# Patient Record
Sex: FEMALE | Race: BLACK OR AFRICAN AMERICAN | NOT HISPANIC OR LATINO | Employment: FULL TIME | URBAN - METROPOLITAN AREA
[De-identification: names, ages, dates, MRNs, and addresses within clinical notes are randomized per-mention and may not be internally consistent; named-entity substitution may affect disease eponyms.]

---

## 2017-08-29 ENCOUNTER — TRANSFERRED RECORDS (OUTPATIENT)
Dept: MULTI SPECIALTY CLINIC | Facility: CLINIC | Age: 37
End: 2017-08-29

## 2017-08-29 LAB
HPV ABSTRACT: NORMAL
PAP-ABSTRACT: NORMAL

## 2017-10-01 ENCOUNTER — HEALTH MAINTENANCE LETTER (OUTPATIENT)
Age: 37
End: 2017-10-01

## 2021-05-03 ENCOUNTER — OFFICE VISIT (OUTPATIENT)
Dept: FAMILY MEDICINE | Facility: CLINIC | Age: 41
End: 2021-05-03
Payer: COMMERCIAL

## 2021-05-03 VITALS
DIASTOLIC BLOOD PRESSURE: 102 MMHG | HEIGHT: 62 IN | WEIGHT: 170 LBS | HEART RATE: 82 BPM | BODY MASS INDEX: 31.28 KG/M2 | TEMPERATURE: 98.4 F | RESPIRATION RATE: 16 BRPM | OXYGEN SATURATION: 98 % | SYSTOLIC BLOOD PRESSURE: 170 MMHG

## 2021-05-03 DIAGNOSIS — L02.412 ABSCESS OF AXILLA, LEFT: ICD-10-CM

## 2021-05-03 DIAGNOSIS — D68.51 FACTOR V LEIDEN CARRIER (H): ICD-10-CM

## 2021-05-03 DIAGNOSIS — I10 BENIGN ESSENTIAL HYPERTENSION: ICD-10-CM

## 2021-05-03 DIAGNOSIS — Z00.00 ROUTINE GENERAL MEDICAL EXAMINATION AT A HEALTH CARE FACILITY: Primary | ICD-10-CM

## 2021-05-03 DIAGNOSIS — F41.9 ANXIETY: ICD-10-CM

## 2021-05-03 PROCEDURE — 99214 OFFICE O/P EST MOD 30 MIN: CPT | Mod: 25 | Performed by: FAMILY MEDICINE

## 2021-05-03 PROCEDURE — 96127 BRIEF EMOTIONAL/BEHAV ASSMT: CPT | Performed by: FAMILY MEDICINE

## 2021-05-03 PROCEDURE — 99386 PREV VISIT NEW AGE 40-64: CPT | Performed by: FAMILY MEDICINE

## 2021-05-03 RX ORDER — ESCITALOPRAM OXALATE 10 MG/1
TABLET ORAL
COMMUNITY
Start: 2020-05-22 | End: 2021-05-03

## 2021-05-03 RX ORDER — LOSARTAN POTASSIUM 100 MG/1
100 TABLET ORAL
Status: CANCELLED | OUTPATIENT
Start: 2021-05-03

## 2021-05-03 RX ORDER — LABETALOL 200 MG/1
TABLET, FILM COATED ORAL
COMMUNITY
End: 2021-05-03

## 2021-05-03 RX ORDER — AMLODIPINE AND VALSARTAN 5; 320 MG/1; MG/1
1 TABLET ORAL DAILY
Qty: 90 TABLET | Refills: 0 | Status: SHIPPED | OUTPATIENT
Start: 2021-05-03 | End: 2021-07-28

## 2021-05-03 RX ORDER — HYDROCHLOROTHIAZIDE 50 MG/1
50 TABLET ORAL DAILY
COMMUNITY
End: 2021-05-03

## 2021-05-03 RX ORDER — METOPROLOL SUCCINATE 100 MG/1
100 TABLET, EXTENDED RELEASE ORAL DAILY
Qty: 90 TABLET | Refills: 0 | Status: SHIPPED | OUTPATIENT
Start: 2021-05-03 | End: 2021-09-14

## 2021-05-03 RX ORDER — HYDROCHLOROTHIAZIDE 50 MG/1
50 TABLET ORAL DAILY
Qty: 90 TABLET | Refills: 0 | Status: SHIPPED | OUTPATIENT
Start: 2021-05-03 | End: 2021-07-28

## 2021-05-03 RX ORDER — METOPROLOL SUCCINATE 100 MG/1
100 TABLET, EXTENDED RELEASE ORAL DAILY
COMMUNITY
Start: 2020-08-20 | End: 2021-05-03

## 2021-05-03 SDOH — HEALTH STABILITY: MENTAL HEALTH: HOW OFTEN DO YOU HAVE 6 OR MORE DRINKS ON ONE OCCASION?: NEVER

## 2021-05-03 SDOH — HEALTH STABILITY: MENTAL HEALTH: HOW MANY STANDARD DRINKS CONTAINING ALCOHOL DO YOU HAVE ON A TYPICAL DAY?: 1 OR 2

## 2021-05-03 SDOH — HEALTH STABILITY: MENTAL HEALTH: HOW OFTEN DO YOU HAVE A DRINK CONTAINING ALCOHOL?: 2-4 TIMES A MONTH

## 2021-05-03 ASSESSMENT — ANXIETY QUESTIONNAIRES
5. BEING SO RESTLESS THAT IT IS HARD TO SIT STILL: NEARLY EVERY DAY
2. NOT BEING ABLE TO STOP OR CONTROL WORRYING: NEARLY EVERY DAY
3. WORRYING TOO MUCH ABOUT DIFFERENT THINGS: NEARLY EVERY DAY
1. FEELING NERVOUS, ANXIOUS, OR ON EDGE: NEARLY EVERY DAY
7. FEELING AFRAID AS IF SOMETHING AWFUL MIGHT HAPPEN: NEARLY EVERY DAY
IF YOU CHECKED OFF ANY PROBLEMS ON THIS QUESTIONNAIRE, HOW DIFFICULT HAVE THESE PROBLEMS MADE IT FOR YOU TO DO YOUR WORK, TAKE CARE OF THINGS AT HOME, OR GET ALONG WITH OTHER PEOPLE: VERY DIFFICULT
GAD7 TOTAL SCORE: 21
6. BECOMING EASILY ANNOYED OR IRRITABLE: NEARLY EVERY DAY

## 2021-05-03 ASSESSMENT — MIFFLIN-ST. JEOR: SCORE: 1394.36

## 2021-05-03 ASSESSMENT — PATIENT HEALTH QUESTIONNAIRE - PHQ9
5. POOR APPETITE OR OVEREATING: NEARLY EVERY DAY
SUM OF ALL RESPONSES TO PHQ QUESTIONS 1-9: 19

## 2021-05-03 ASSESSMENT — PAIN SCALES - GENERAL: PAINLEVEL: NO PAIN (0)

## 2021-05-03 NOTE — Clinical Note
[place] [facility] [place]. Please abstract both into patient chart. Ally May M.D.

## 2021-05-03 NOTE — PATIENT INSTRUCTIONS
At [facility], we strive to deliver an exceptional experience to you, every time we see you. If you receive a survey, please complete it as we do value your feedback.  If you have MyChart, you can expect to receive results automatically within 24 hours of their completion.  Your provider will send a note interpreting your results as well.   If you do not have MyChart, you should receive your results in about a week by mail.    Your care team:                            Family Medicine Internal Medicine   MD Mu Buchanan MD Shantel Branch-Fleming, MD Srinivasa Vaka, MD Katya Belousova, PAVaniaC  Nory Ma, APRN CNP    Jordan Melton, MD Pediatrics   Haja Barraza, PACHRISTOPHER Richardson, CNP MD Daniella Roger APRN CNP   MD Makenzie Martin MD Deborah Mielke, MD Yara Ruano, APRN [facility]      Clinic hours: Monday - Thursday 7 am-6 pm; Fridays 7 am-5 pm.   Urgent care: Monday - Friday 10 am- 8 pm; Saturday and Sunday 9 am-5 pm.    Clinic: [phone]       [facility]: Monday - Thursday 8 am - 7 pm; Friday 8 am - 6 pm  [facility]: [phone]     Use [email or web address] for 24/7 diagnosis and treatment of dozens of conditions.    Preventive Health Recommendations  Female Ages 40 to 49    Yearly exam:     See your health care provider every year in order to  1. Review health changes.   2. Discuss preventive care.    3. Review your medicines if your doctor prescribed any.      Get a Pap test every three years (unless you have an abnormal result and your provider advises testing more often).      If you get Pap tests with HPV test, you only need to test every 5 years, unless you have an abnormal result. You do not need a Pap test if your uterus was removed (hysterectomy) and you have not had cancer.      You should be tested each year for STDs (sexually transmitted diseases), if you're  at risk.     Ask your doctor if you should have a mammogram.      Have a colonoscopy (test for colon cancer) if someone in your family has had colon cancer or polyps before age 50.       Have a cholesterol test every 5 years.       Have a diabetes test (fasting glucose) after age 45. If you are at risk for diabetes, you should have this test every 3 years.    Shots: Get a flu shot each year. Get a tetanus shot every 10 years.     Nutrition:     Eat at least 5 servings of fruits and vegetables each day.    Eat whole-grain bread, whole-wheat pasta and brown rice instead of white grains and rice.    Get adequate Calcium and Vitamin D.      Lifestyle    Exercise at least 150 minutes a week (an average of 30 minutes a day, 5 days a week). This will help you control your weight and prevent disease.    Limit alcohol to one drink per day.    No smoking.     Wear sunscreen to prevent skin cancer.    See your dentist every six months for an exam and cleaning.    At [facility], we strive to deliver an exceptional experience to you, every time we see you. If you receive a survey, please complete it as we do value your feedback.  If you have gDecidehart, you can expect to receive results automatically within 24 hours of their completion.  Your provider will send a note interpreting your results as well.   If you do not have MyChart, you should receive your results in about a week by mail.    Your care team:                            Family Medicine Internal Medicine   MD Mu Buchanan MD Shantel Branch-Fleming, MD Srinivasa Vaka, MD Katya Belousova, PA-C Megan Hill, APRN CNP Nam Ho, MD Pediatrics   MALIKA Kennedy CNP Paula Brito, MD Amelia Massimini APRN MD Makenzie Fulton MD Deborah Mielke, MD Kim Thein, RICO [facility]      Clinic hours: Monday - Thursday 7 am-6 pm; Fridays 7 am-5 pm.   Urgent care:  Monday - Friday 10 am- 8 pm; Saturday and Sunday 9 am-5 pm.    Clinic: [phone]       [facility]: Monday - Thursday 8 am - 7 pm; Friday 8 am - 6 pm  [facility]: [phone]     Use [email or web address] for 24/7 diagnosis and treatment of dozens of conditions.

## 2021-05-03 NOTE — PROGRESS NOTES
SUBJECTIVE:   CC: Kandy Rosales is an 41 year old woman who presents for preventive health visit.     Patient has been advised of split billing requirements and indicates understanding: Yes  Healthy Habits:    Do you get at least three servings of calcium containing foods daily (dairy, green leafy vegetables, etc.)? No     Amount of exercise or daily activities, outside of work: 3 day(s) per week    Problems taking medications regularly No    Medication side effects: No    Have you had an eye exam in the past two years? yes    Do you see a dentist twice per year? no    Do you have sleep apnea, excessive snoring or daytime drowsiness?no    Was positive for COVID April 17 and stopped medications. Restarted medication about 5 days ago.  HTN wasn't completely controlled before.    PAP done 2017 and good for 5 years    Depression/anxiety - work from home which she likes. Feels lexapro didn't do much.  Felt more anxiety going out and interacting since has been sick.  Is able go out and do things.  Was on lexapro as first medication only but stopped months ago.    Today's PHQ-2 Score:   PHQ-2 ( 1999 Pfizer) 5/3/2021   Q1: Little interest or pleasure in doing things 3   Q2: Feeling down, depressed or hopeless 1   PHQ-2 Score 4       Abuse: Current or Past(Physical, Sexual or Emotional)- No  Do you feel safe in your environment? Yes        Social History     Tobacco Use     Smoking status: Never Smoker     Smokeless tobacco: Never Used   Substance Use Topics     Alcohol use: Yes     Frequency: 2-4 times a month     Drinks per session: 1 or 2     Binge frequency: Never     If you drink alcohol do you typically have >3 drinks per day or >7 drinks per week? No                     Reviewed orders with patient.  Reviewed health maintenance and updated orders accordingly - Yes  Labs reviewed in EPIC    FSH-7:   Breast CA Risk Assessment (FHS-7) 5/3/2021 5/3/2021   Did any of your first-degree relatives have breast or  "ovarian cancer? No Yes   Did any of your relatives have bilateral breast cancer? No Unknown   Did any man in your family have breast cancer? No No   Did any woman in your family have breast and ovarian cancer? Yes Yes   Did any woman in your family have breast cancer before age 50 y? No Yes   Do you have 2 or more relatives with breast and/or ovarian cancer? Yes Yes   Do you have 2 or more relatives with breast and/or bowel cancer? No Yes       Mammogram Screening - Offered annual screening and updated Health Maintenance for [place] plan based on risk factor consideration    Pertinent mammograms are reviewed under the imaging tab.    Pertinent mammograms are reviewed under the imaging tab.  History of abnormal Pap smear: NO - age 30-65 PAP every 5 years with negative HPV co-testing recommended  PAP / HPV 6/30/2009   PAP NIL     Reviewed and updated as needed this visit by clinical staff  Tobacco   Meds  Problems  Med Hx  Surg Hx  Fam Hx  Soc Hx        Reviewed and updated as needed this visit by Provider  Tobacco   Meds  Problems  Med Hx  Surg Hx  Fam Hx             ROS:  10 point ROS of systems including Constitutional, Eyes, Respiratory, Cardiovascular, Gastroenterology, Genitourinary, Integumentary, Muscularskeletal, Psychiatric were all negative except for pertinent positives noted in my HPI.     OBJECTIVE:   BP (!) 170/102 (BP Location: Right arm, Patient Position: Chair, Cuff Size: Adult Large)   Pulse 82   Temp 98.4  F (36.9  C) (Tympanic)   Resp 16   Ht 1.575 m (5' 2\")   Wt 77.1 kg (170 lb)   LMP 04/27/2021 (Exact Date)   SpO2 98%   BMI 31.09 kg/m    EXAM:  GENERAL: healthy, alert and no distress  EYES: Eyes grossly normal to inspection, PERRL and conjunctivae and sclerae normal  HENT: ear canals and TM's normal, nose and mouth without ulcers or lesions  NECK: no adenopathy, no asymmetry, masses, or scars and thyroid normal to palpation  RESP: lungs clear to auscultation - no rales, " rhonchi or wheezes  BREAST: normal without masses, tenderness or nipple discharge and no palpable axillary masses or adenopathy  CV: regular rate and rhythm, normal S1 S2, no S3 or S4, no murmur, click or rub, no peripheral edema and peripheral pulses strong  ABDOMEN: soft, nontender, no hepatosplenomegaly, no masses and bowel sounds normal  MS: no gross musculoskeletal defects noted, no edema  SKIN: no suspicious lesions or rashes  NEURO: Normal strength and tone, mentation intact and speech normal  PSYCH: mentation appears normal, affect normal/bright    Diagnostic Test Results:  Labs reviewed in Epic    ASSESSMENT/PLAN:   1. Routine general medical examination at a health care facility  Routine preventive discussed    2. Benign essential hypertension  Not controlled - add amlodipine and change losartan to valsartan  - hydrochlorothiazide (HYDRODIURIL) 50 MG tablet; Take 1 tablet (50 mg) by mouth daily  Dispense: 90 tablet; Refill: 0  - metoprolol succinate ER (TOPROL-XL) 100 MG 24 hr tablet; Take 1 tablet (100 mg) by mouth daily  Dispense: 90 tablet; Refill: 0  - amLODIPine-valsartan (EXFORGE) 5-320 MG tablet; Take 1 tablet by mouth daily  Dispense: 90 tablet; Refill: 0    3. Anxiety/Depression  Not controlled - discussed medication but patient refused for now.    4. Factor V Leiden carrier (H)  Monitor    5. Abscess of axilla, left  Referral for surgical evaluation.  - GENERAL SURG ADULT REFERRAL; Future    The uses and side effects, including black box warnings as appropriate, were discussed in detail.  All patient questions were answered.  The patient was instructed to call immediately if any side effects developed.     Patient has been advised of split billing requirements and indicates understanding: Yes  COUNSELING:   Reviewed preventive health counseling, as reflected in patient instructions    Estimated body mass index is 31.09 kg/m  as calculated from the following:    Height as of this encounter: 1.575  "m (5' 2\").    Weight as of this encounter: 77.1 kg (170 lb).    Weight management plan: Discussed healthy diet and exercise guidelines    She reports that she has never smoked. She has never used smokeless tobacco.      Counseling Resources:  ATP IV Guidelines  Pooled Cohorts Equation Calculator  Breast Cancer Risk Calculator  BRCA-Related Cancer Risk Assessment: FHS-7 Tool  FRAX Risk Assessment  ICSI Preventive Guidelines  Dietary Guidelines for Americans, 2010  USDA's MyPlate  ASA Prophylaxis  Lung CA Screening    Ally Covington MD  [facility]  "

## 2021-05-04 ASSESSMENT — ANXIETY QUESTIONNAIRES: GAD7 TOTAL SCORE: 21

## 2021-07-28 DIAGNOSIS — I10 BENIGN ESSENTIAL HYPERTENSION: ICD-10-CM

## 2021-07-28 RX ORDER — HYDROCHLOROTHIAZIDE 50 MG/1
TABLET ORAL
Qty: 30 TABLET | Refills: 0 | Status: SHIPPED | OUTPATIENT
Start: 2021-07-28 | End: 2021-08-24

## 2021-07-28 RX ORDER — AMLODIPINE AND VALSARTAN 5; 320 MG/1; MG/1
TABLET ORAL
Qty: 30 TABLET | Refills: 0 | Status: SHIPPED | OUTPATIENT
Start: 2021-07-28 | End: 2021-08-24

## 2021-07-28 NOTE — TELEPHONE ENCOUNTER
"Requested Prescriptions   Pending Prescriptions Disp Refills    hydrochlorothiazide (HYDRODIURIL) 50 MG tablet [Pharmacy Med Name: HYDROCHLOROTHIAZIDE 50 MG TAB] 90 tablet 0     Sig: TAKE 1 TABLET BY MOUTH EVERY DAY        Diuretics (Including Combos) Protocol Failed - 7/28/2021  1:18 AM        Failed - Blood pressure under 140/90 in past 12 months       BP Readings from Last 3 Encounters:   05/03/21 (!) 170/102                 Failed - Normal serum creatinine on file in past 12 months     No lab results found.           Failed - Normal serum potassium on file in past 12 months     No lab results found.                 Failed - Normal serum sodium on file in past 12 months     No lab results found.           Passed - Recent (12 mo) or future (30 days) visit within the authorizing provider's specialty     Patient has had an office visit with the authorizing provider or a provider within the authorizing providers department within the previous 12 mos or has a future within next 30 days. See \"Patient Info\" tab in inbasket, or \"Choose Columns\" in Meds & Orders section of the refill encounter.              Passed - Medication is active on med list        Passed - Patient is age 18 or older        Passed - No active pregancy on record        Passed - No positive pregnancy test in past 12 months           amLODIPine-valsartan (EXFORGE) 5-320 MG tablet [Pharmacy Med Name: AMLODIPINE-VALSARTAN 5-320 MG] 90 tablet 0     Sig: TAKE 1 TABLET BY MOUTH EVERY DAY        Angiotensin-II Receptors Failed - 7/28/2021  1:18 AM        Failed - Last blood pressure under 140/90 in past 12 months       BP Readings from Last 3 Encounters:   05/03/21 (!) 170/102                 Failed - Normal serum creatinine on file in past 12 months     No lab results found.    Ok to refill medication if creatinine is low          Failed - Normal serum potassium on file in past 12 months     No lab results found.                 Passed - Recent (12 mo) or " "future (30 days) visit within the authorizing provider's specialty     Patient has had an office visit with the authorizing provider or a provider within the authorizing providers department within the previous 12 mos or has a future within next 30 days. See \"Patient Info\" tab in inbasket, or \"Choose Columns\" in Meds & Orders section of the refill encounter.              Passed - Medication is active on med list        Passed - Patient is age 18 or older        Passed - No active pregnancy on record        Passed - No positive pregnancy test in past 12 months       Calcium Channel Blockers Protocol  Failed - 7/28/2021  1:18 AM        Failed - Blood pressure under 140/90 in past 12 months       BP Readings from Last 3 Encounters:   05/03/21 (!) 170/102                 Failed - Normal serum creatinine on file in past 12 months     No lab results found.    Ok to refill medication if creatinine is low          Passed - Recent (12 mo) or future (30 days) visit within the authorizing provider's specialty     Patient has had an office visit with the authorizing provider or a provider within the authorizing providers department within the previous 12 mos or has a future within next 30 days. See \"Patient Info\" tab in inbasket, or \"Choose Columns\" in Meds & Orders section of the refill encounter.              Passed - Medication is active on med list        Passed - Patient is age 18 or older        Passed - No active pregnancy on record        Passed - No positive pregnancy test in past 12 months              "

## 2021-08-23 DIAGNOSIS — I10 BENIGN ESSENTIAL HYPERTENSION: ICD-10-CM

## 2021-08-24 RX ORDER — AMLODIPINE AND VALSARTAN 5; 320 MG/1; MG/1
TABLET ORAL
Qty: 30 TABLET | Refills: 0 | Status: SHIPPED | OUTPATIENT
Start: 2021-08-24 | End: 2021-09-14 | Stop reason: DRUGHIGH

## 2021-08-24 RX ORDER — HYDROCHLOROTHIAZIDE 50 MG/1
TABLET ORAL
Qty: 30 TABLET | Refills: 0 | Status: SHIPPED | OUTPATIENT
Start: 2021-08-24 | End: 2021-09-14

## 2021-08-24 NOTE — TELEPHONE ENCOUNTER
Routing refill request to provider for review/approval because:  Labs not current:    Potassium   Date Value Ref Range Status   06/30/2009 3.7 3.4 - 5.3 mmol/L Final     Creatinine   Date Value Ref Range Status   06/30/2009 0.67 0.52 - 1.04 mg/dL Final     Comment:     New IDMS-traceable calibration  beginning 5/1/08     Creatinine   Date Value Ref Range Status   06/30/2009 0.67 0.52 - 1.04 mg/dL Final     Comment:     New IDMS-traceable calibration  beginning 5/1/08     BP Readings from Last 6 Encounters:   05/03/21 (!) 170/102     Tita refill given and patient has not yet scheduled an appointment.  Health Maintenance Due   Topic Date Due     ANNUAL REVIEW OF HM ORDERS  Never done     ADVANCE CARE PLANNING  Never done     DEPRESSION ACTION PLAN  Never done     HIV SCREENING  Never done     HEPATITIS C SCREENING  Never done     COVID-19 Vaccine (2 - Pfizer 2-dose series) 07/27/2021     Tawanna Chi RN

## 2021-09-14 ENCOUNTER — OFFICE VISIT (OUTPATIENT)
Dept: FAMILY MEDICINE | Facility: CLINIC | Age: 41
End: 2021-09-14
Payer: COMMERCIAL

## 2021-09-14 VITALS
WEIGHT: 174 LBS | BODY MASS INDEX: 31.83 KG/M2 | OXYGEN SATURATION: 100 % | TEMPERATURE: 97.7 F | DIASTOLIC BLOOD PRESSURE: 95 MMHG | SYSTOLIC BLOOD PRESSURE: 180 MMHG | HEART RATE: 58 BPM

## 2021-09-14 DIAGNOSIS — Z23 NEED FOR PROPHYLACTIC VACCINATION AND INOCULATION AGAINST INFLUENZA: ICD-10-CM

## 2021-09-14 DIAGNOSIS — I10 BENIGN ESSENTIAL HYPERTENSION: Primary | ICD-10-CM

## 2021-09-14 DIAGNOSIS — Z02.89 ENCOUNTER FOR COMPLETION OF FORM WITH PATIENT: ICD-10-CM

## 2021-09-14 DIAGNOSIS — F41.9 ANXIETY: ICD-10-CM

## 2021-09-14 DIAGNOSIS — F32.1 CURRENT MODERATE EPISODE OF MAJOR DEPRESSIVE DISORDER WITHOUT PRIOR EPISODE (H): ICD-10-CM

## 2021-09-14 DIAGNOSIS — D68.51 FACTOR V LEIDEN CARRIER (H): ICD-10-CM

## 2021-09-14 LAB
ANION GAP SERPL CALCULATED.3IONS-SCNC: 7 MMOL/L (ref 3–14)
BUN SERPL-MCNC: 17 MG/DL (ref 7–30)
CALCIUM SERPL-MCNC: 9.4 MG/DL (ref 8.5–10.1)
CHLORIDE BLD-SCNC: 103 MMOL/L (ref 94–109)
CHOLEST SERPL-MCNC: 246 MG/DL
CO2 SERPL-SCNC: 31 MMOL/L (ref 20–32)
CREAT SERPL-MCNC: 0.77 MG/DL (ref 0.52–1.04)
FASTING STATUS PATIENT QL REPORTED: YES
GFR SERPL CREATININE-BSD FRML MDRD: >90 ML/MIN/1.73M2
GLUCOSE BLD-MCNC: 101 MG/DL (ref 70–99)
HBA1C MFR BLD: 5.5 % (ref 0–5.6)
HDLC SERPL-MCNC: 75 MG/DL
LDLC SERPL CALC-MCNC: 154 MG/DL
NONHDLC SERPL-MCNC: 171 MG/DL
POTASSIUM BLD-SCNC: 3 MMOL/L (ref 3.4–5.3)
SODIUM SERPL-SCNC: 141 MMOL/L (ref 133–144)
TRIGL SERPL-MCNC: 83 MG/DL
TSH SERPL DL<=0.005 MIU/L-ACNC: 1.13 MU/L (ref 0.4–4)

## 2021-09-14 PROCEDURE — 83036 HEMOGLOBIN GLYCOSYLATED A1C: CPT | Performed by: PREVENTIVE MEDICINE

## 2021-09-14 PROCEDURE — 99214 OFFICE O/P EST MOD 30 MIN: CPT | Mod: 25 | Performed by: PREVENTIVE MEDICINE

## 2021-09-14 PROCEDURE — 80061 LIPID PANEL: CPT | Performed by: PREVENTIVE MEDICINE

## 2021-09-14 PROCEDURE — 96127 BRIEF EMOTIONAL/BEHAV ASSMT: CPT | Performed by: PREVENTIVE MEDICINE

## 2021-09-14 PROCEDURE — 36415 COLL VENOUS BLD VENIPUNCTURE: CPT | Performed by: PREVENTIVE MEDICINE

## 2021-09-14 PROCEDURE — 90471 IMMUNIZATION ADMIN: CPT | Performed by: PREVENTIVE MEDICINE

## 2021-09-14 PROCEDURE — 84443 ASSAY THYROID STIM HORMONE: CPT | Performed by: PREVENTIVE MEDICINE

## 2021-09-14 PROCEDURE — 80048 BASIC METABOLIC PNL TOTAL CA: CPT | Performed by: PREVENTIVE MEDICINE

## 2021-09-14 PROCEDURE — 82043 UR ALBUMIN QUANTITATIVE: CPT | Performed by: PREVENTIVE MEDICINE

## 2021-09-14 PROCEDURE — 90686 IIV4 VACC NO PRSV 0.5 ML IM: CPT | Performed by: PREVENTIVE MEDICINE

## 2021-09-14 RX ORDER — ESCITALOPRAM OXALATE 10 MG/1
10 TABLET ORAL DAILY
Qty: 30 TABLET | Refills: 1 | Status: SHIPPED | OUTPATIENT
Start: 2021-09-14 | End: 2021-10-06

## 2021-09-14 RX ORDER — METOPROLOL SUCCINATE 100 MG/1
100 TABLET, EXTENDED RELEASE ORAL DAILY
Qty: 90 TABLET | Refills: 0 | Status: SHIPPED | OUTPATIENT
Start: 2021-09-14 | End: 2022-01-06

## 2021-09-14 RX ORDER — AMLODIPINE AND VALSARTAN 10; 320 MG/1; MG/1
1 TABLET ORAL DAILY
Qty: 90 TABLET | Refills: 0 | Status: SHIPPED | OUTPATIENT
Start: 2021-09-14

## 2021-09-14 RX ORDER — HYDROCHLOROTHIAZIDE 50 MG/1
50 TABLET ORAL DAILY
Qty: 90 TABLET | Refills: 0 | Status: SHIPPED | OUTPATIENT
Start: 2021-09-14 | End: 2021-09-15

## 2021-09-14 RX ORDER — HYDROXYZINE HYDROCHLORIDE 25 MG/1
25-100 TABLET, FILM COATED ORAL EVERY 8 HOURS PRN
Qty: 90 TABLET | Refills: 0 | Status: SHIPPED | OUTPATIENT
Start: 2021-09-14 | End: 2021-10-19

## 2021-09-14 ASSESSMENT — PATIENT HEALTH QUESTIONNAIRE - PHQ9
5. POOR APPETITE OR OVEREATING: NEARLY EVERY DAY
SUM OF ALL RESPONSES TO PHQ QUESTIONS 1-9: 23

## 2021-09-14 ASSESSMENT — ANXIETY QUESTIONNAIRES
5. BEING SO RESTLESS THAT IT IS HARD TO SIT STILL: SEVERAL DAYS
1. FEELING NERVOUS, ANXIOUS, OR ON EDGE: NEARLY EVERY DAY
GAD7 TOTAL SCORE: 19
IF YOU CHECKED OFF ANY PROBLEMS ON THIS QUESTIONNAIRE, HOW DIFFICULT HAVE THESE PROBLEMS MADE IT FOR YOU TO DO YOUR WORK, TAKE CARE OF THINGS AT HOME, OR GET ALONG WITH OTHER PEOPLE: VERY DIFFICULT
7. FEELING AFRAID AS IF SOMETHING AWFUL MIGHT HAPPEN: NEARLY EVERY DAY
2. NOT BEING ABLE TO STOP OR CONTROL WORRYING: NEARLY EVERY DAY
6. BECOMING EASILY ANNOYED OR IRRITABLE: NEARLY EVERY DAY
3. WORRYING TOO MUCH ABOUT DIFFERENT THINGS: NEARLY EVERY DAY

## 2021-09-14 ASSESSMENT — PAIN SCALES - GENERAL: PAINLEVEL: NO PAIN (0)

## 2021-09-14 NOTE — PATIENT INSTRUCTIONS
At [facility], we strive to deliver an exceptional experience to you, every time we see you. If you receive a survey, please complete it as we do value your feedback.  If you have MyChart, you can expect to receive results automatically within 24 hours of their completion.  Your provider will send a note interpreting your results as well.   If you do not have MyChart, you should receive your results in about a week by mail.    Your care team:                            Family Medicine Internal Medicine   MD Mu Buchanan MD Shantel Branch-Fleming, MD Srinivasa Vaka, MD Katya Belousova, PACHRISTOPHER Ma, APRN CNP    Jordan Melton, MD Pediatrics   Haja Barraza, PACHRISTOPHER Richardson, CNP MD Daniella Roger APRN CNP   MD Makenzie Martin MD Deborah Mielke, MD Yara Ruano, APRN [facility]      Clinic hours: Monday - Thursday 7 am-6 pm; Fridays 7 am-5 pm.   Urgent care: Monday - Friday 10 am- 8 pm; Saturday and Sunday 9 am-5 pm.    Clinic: [phone]       [facility]: Monday - Thursday 8 am - 7 pm; Friday 8 am - 6 pm  [facility]: [phone]     Use [email or web address] for 24/7 diagnosis and treatment of dozens of conditions.

## 2021-09-14 NOTE — RESULT ENCOUNTER NOTE
Kandy,     Three month glucose number is normal, you do not have diabetes or pre diabetes. Other results are pending.     Please do not hesitate to call us at [phone] if you have any questions or concerns.    Thank you,    Sarah Bhatia MD MPH

## 2021-09-14 NOTE — PROGRESS NOTES
Assessment & Plan     Benign essential hypertension  -Blood pressure readings are elevated  -Increased amlodipine valsartan combination to  mg once a day  -Continue hydrochlorothiazide  -Continue metoprolol  - Lipid panel reflex to direct LDL Non-fasting  - Albumin Random Urine Quantitative with Creat Ratio  - Hemoglobin A1c  - Basic metabolic panel  - metoprolol succinate ER (TOPROL-XL) 100 MG 24 hr tablet  Dispense: 90 tablet; Refill: 0  - amLODIPine-valsartan (EXFORGE)  MG tablet  Dispense: 90 tablet; Refill: 0  - hydrochlorothiazide (HYDRODIURIL) 50 MG tablet  Dispense: 90 tablet; Refill: 0  -Refills on medication provided  -Patient will check blood pressure at home  -We will do a video follow-up in 4 weeks, if blood pressure readings are still high then further adjustment will be needed    Factor V Leiden carrier (H)  -Stable  -No recent history of blood clots    Current moderate episode of major depressive disorder without prior episode (H)  -Mood symptoms have worsened  -Restarted Lexapro  -Used to be on 20 mg of Lexapro, has not been on medication for some months  - MENTAL HEALTH REFERRAL  - Adult; Outpatient Treatment; Individual/Couples/Family/Group Therapy/Health Psychology; [facility] [phone]; We will contact you to schedule the appointment or please call with any questions  - escitalopram (LEXAPRO) 10 MG tablet  Dispense: 30 tablet; Refill: 1  - hydrOXYzine (ATARAX) 25 MG tablet  Dispense: 90 tablet; Refill: 0  -Video follow-up in 4 weeks  -If tolerating medication without side effects then plan to increase Escitalopram to 20 mg once a day    We discussed the treatment for anxiety and depression in detail.  The importance of a multi faceted approach in controlling symptoms was reviewed.  The benefits of cognitive behavioral therapy reviewed, benefits of exercise, and stress reduction also discussed. It may take 3-4 weeks before symptom improvement happens.  Do not  stop medication suddenly, medication will need to be tapered off.  Slight increased risk of suicide with SSRI group of medications discussed.      Anxiety  - TSH with free T4 reflex  -Referral for counseling provided  -Started on selective serotonin reuptake inhibitor  -Benefits of exercise reviewed    Need for prophylactic vaccination and inoculation against influenza  -Vaccination done today  - INFLUENZA VACCINE IM > 6 MONTHS VALENT IIV4 (AFLURIA/FLUZONE)    Encounter for completion of form with patient  -Patient requested completion of form for time missed from work  -Deadline is 9/20/2021  -Form completed.      Ordering of each unique test  Prescription drug management  35 minutes spent on the date of the encounter doing chart review, history and exam, documentation and further activities per the note        Return in about 4 weeks (around 10/12/2021) for Follow up, with me, using a video visit, with any available provider.    Sarah Bhatia MD MPH    [facility]    Carmen Gaitan is a 41 year old who presents for the following health issues   HPI     Hypertension Follow-up      Do you check your blood pressure regularly outside of the clinic? No     Are you following a low salt diet? Yes    Are your blood pressures ever more than 140 on the top number (systolic) OR more   than 90 on the bottom number (diastolic), for example 140/90? Yes    Depression and Anxiety Follow-Up    How are you doing with your depression since your last visit? Worsened     How are you doing with your anxiety since your last visit?  Worsened     Are you having other symptoms that might be associated with depression or anxiety? No    Have you had a significant life event? OTHER: Work and home stress     Do you have any concerns with your use of alcohol or other drugs? No    Social History     Tobacco Use     Smoking status: Never Smoker     Smokeless tobacco: Never Used   Substance Use Topics     Alcohol  use: Yes     Drug use: Never     PHQ 5/3/2021 9/14/2021   PHQ-9 Total Score 19 23   Q9: Thoughts of better off dead/self-harm past 2 weeks Several days Not at all     KAILA-7 SCORE 5/3/2021 9/14/2021   Total Score 21 19     Last PHQ-9 9/14/2021   1.  Little interest or pleasure in doing things 3   2.  Feeling down, depressed, or hopeless 3   3.  Trouble falling or staying asleep, or sleeping too much 3   4.  Feeling tired or having little energy 3   5.  Poor appetite or overeating 3   6.  Feeling bad about yourself 3   7.  Trouble concentrating 3   8.  Moving slowly or restless 2   Q9: Thoughts of better off dead/self-harm past 2 weeks 0   PHQ-9 Total Score 23   Difficulty at work, home, or with people Very difficult     KAILA-7  9/14/2021   1. Feeling nervous, anxious, or on edge 3   2. Not being able to stop or control worrying 3   3. Worrying too much about different things 3   4. Trouble relaxing 3   5. Being so restless that it is hard to sit still 1   6. Becoming easily annoyed or irritable 3   7. Feeling afraid, as if something awful might happen 3   KAILA-7 Total Score 19   If you checked any problems, how difficult have they made it for you to do your work, take care of things at home, or get along with other people? Very difficult       Suicide Assessment Five-step Evaluation and Treatment (SAFE-T)      Works from home  Counseling has been done via Video   Has had problems sleeping  Has used Trazodone in the past, not sure if she can continue with this   Used to be on Lexapro, not used for some time  Increased stress at home and work   Works for a call center  Needs forms completed for time missed from work  Deadline of forms is 9/20/21       Review of Systems   Constitutional, HEENT, cardiovascular, pulmonary, gi and gu systems are negative, except as otherwise noted.      Objective    BP (!) 180/95   Pulse 58   Temp 97.7  F (36.5  C) (Tympanic)   Wt 78.9 kg (174 lb)   SpO2 100%   BMI 31.83 kg/m    Body  mass index is 31.83 kg/m .  Physical Exam   GENERAL APPEARANCE: healthy, alert and no distress  NECK: no adenopathy and trachea midline and normal to palpation  RESP: lungs clear to auscultation - no rales, rhonchi or wheezes  CV: regular rates and rhythm, normal S1 S2, no S3 or S4 and no murmur, click or rub  ABDOMEN: soft, non-tender and no rebound or guarding   MS: extremities normal- no gross deformities noted and peripheral pulses normal  SKIN: no suspicious lesions or rashes  NEURO: Normal strength and tone, mentation intact and speech normal  PSYCH: mentation appears normal      Results for orders placed or performed in visit on 09/14/21   Hemoglobin A1c     Status: Normal   Result Value Ref Range    Hemoglobin A1C 5.5 0.0 - 5.6 %

## 2021-09-15 ENCOUNTER — TELEPHONE (OUTPATIENT)
Dept: FAMILY MEDICINE | Facility: CLINIC | Age: 41
End: 2021-09-15

## 2021-09-15 DIAGNOSIS — E87.6 HYPOKALEMIA: Primary | ICD-10-CM

## 2021-09-15 DIAGNOSIS — I10 BENIGN ESSENTIAL HYPERTENSION: ICD-10-CM

## 2021-09-15 LAB
CREAT UR-MCNC: 71 MG/DL
MICROALBUMIN UR-MCNC: 30 MG/L
MICROALBUMIN/CREAT UR: 42.25 MG/G CR (ref 0–25)

## 2021-09-15 RX ORDER — POTASSIUM CHLORIDE 1500 MG/1
20 TABLET, EXTENDED RELEASE ORAL 2 TIMES DAILY
Qty: 10 TABLET | Refills: 0 | Status: SHIPPED | OUTPATIENT
Start: 2021-09-15 | End: 2021-09-20

## 2021-09-15 RX ORDER — HYDROCHLOROTHIAZIDE 50 MG/1
25 TABLET ORAL DAILY
Qty: 90 TABLET | Refills: 0
Start: 2021-09-15

## 2021-09-15 ASSESSMENT — ANXIETY QUESTIONNAIRES: GAD7 TOTAL SCORE: 19

## 2021-09-15 NOTE — RESULT ENCOUNTER NOTE
Dear Kandy Rosales    Here are your cholesterol results:    Your LDL is: Lab Results       Component                Value               Date                       LDL                      154                 09/14/2021                 LDL                      118                 06/30/2009          Your LDL goal is to be less than 160  Your HDL is: Lab Results       Component                Value               Date                       HDL                      75                  09/14/2021                 HDL                      67                  06/30/2009           Goal HDL is Greater than 40 (for men) or 50 (for women).  Your Triglycerides are: Lab Results       Component                Value               Date                       TRIG                     83                  09/14/2021                 TRIG                     154                 06/30/2009          Goal TRIGLYCERIDES are less than 150.         Here are some ways to improve your cholesterol without medication:    Try to get at least 45 minutes of aerobic exercise 5-6 days a week  Maintain a healthy body weight  Eat less saturated fats  Buy lean cuts of meat, reduce your portions of red meat or substitute poultry or fish  Avoid fried or fast foods that are high in fat  Eat more fruits and vegetables      Glucose, kidney function and thyroid function labs are normal.    POTASSIUM is low at 3. I would like you to take HALF of the hydrochlorothiazide 50 mg tablet once a day.  I have also sent in potassium supplements to your pharmacy. Please schedule a lab only visit to recheck potassium in 5 days.     Please do not hesitate to call us at [phone] if you have any questions or concerns.    Thank you,    Sarah Bhatia MD MPH

## 2021-09-21 ENCOUNTER — TELEPHONE (OUTPATIENT)
Dept: FAMILY MEDICINE | Facility: CLINIC | Age: 41
End: 2021-09-21
Payer: COMMERCIAL

## 2021-09-21 NOTE — TELEPHONE ENCOUNTER
.Reason for Call:  Form, our goal is to have forms completed with 72 hours, however, some forms may require a visit or additional information.    Type of letter, form or note:  FMLA    Who is the form from?: Patient    Where did the form come from: Patient or family brought in       What clinic location was the form placed at?: [facility]    Where the form was placed: [facility]    What number is listed as a contact on the form?: [phone]       Additional comments: Please fax to [phone] and contact patient once complete.    Call taken on 9/21/2021 at 4:14 PM by JUAN ROSA

## 2021-09-24 NOTE — TELEPHONE ENCOUNTER
Forms have been faxed to the [employer] at [phone] as directed. A copy has been maintained for our records, and a copy has been sent to abstracting. The originals will be placed at the  for the pt to .     Pt has been called regarding the completion of the forms.    Thank you,  Jada Joel,   [facility]

## 2021-10-19 ENCOUNTER — VIRTUAL VISIT (OUTPATIENT)
Dept: FAMILY MEDICINE | Facility: CLINIC | Age: 41
End: 2021-10-19
Payer: COMMERCIAL

## 2021-10-19 DIAGNOSIS — I10 UNCONTROLLED HYPERTENSION: Primary | ICD-10-CM

## 2021-10-19 DIAGNOSIS — R06.83 SNORING: ICD-10-CM

## 2021-10-19 DIAGNOSIS — F32.1 CURRENT MODERATE EPISODE OF MAJOR DEPRESSIVE DISORDER WITHOUT PRIOR EPISODE (H): ICD-10-CM

## 2021-10-19 PROCEDURE — 99214 OFFICE O/P EST MOD 30 MIN: CPT | Mod: GT | Performed by: PREVENTIVE MEDICINE

## 2021-10-19 RX ORDER — HYDRALAZINE HYDROCHLORIDE 25 MG/1
25 TABLET, FILM COATED ORAL 2 TIMES DAILY
Qty: 180 TABLET | Refills: 1 | Status: SHIPPED | OUTPATIENT
Start: 2021-10-19 | End: 2022-06-10

## 2021-10-19 RX ORDER — ESCITALOPRAM OXALATE 10 MG/1
10 TABLET ORAL DAILY
Qty: 90 TABLET | Refills: 1 | Status: SHIPPED | OUTPATIENT
Start: 2021-10-19 | End: 2022-06-03

## 2021-10-19 RX ORDER — HYDROXYZINE HYDROCHLORIDE 25 MG/1
25-100 TABLET, FILM COATED ORAL EVERY 8 HOURS PRN
Qty: 90 TABLET | Refills: 1 | Status: SHIPPED | OUTPATIENT
Start: 2021-10-19

## 2021-10-19 ASSESSMENT — ANXIETY QUESTIONNAIRES
7. FEELING AFRAID AS IF SOMETHING AWFUL MIGHT HAPPEN: NEARLY EVERY DAY
3. WORRYING TOO MUCH ABOUT DIFFERENT THINGS: MORE THAN HALF THE DAYS
6. BECOMING EASILY ANNOYED OR IRRITABLE: SEVERAL DAYS
2. NOT BEING ABLE TO STOP OR CONTROL WORRYING: MORE THAN HALF THE DAYS
IF YOU CHECKED OFF ANY PROBLEMS ON THIS QUESTIONNAIRE, HOW DIFFICULT HAVE THESE PROBLEMS MADE IT FOR YOU TO DO YOUR WORK, TAKE CARE OF THINGS AT HOME, OR GET ALONG WITH OTHER PEOPLE: VERY DIFFICULT
GAD7 TOTAL SCORE: 11
5. BEING SO RESTLESS THAT IT IS HARD TO SIT STILL: SEVERAL DAYS
1. FEELING NERVOUS, ANXIOUS, OR ON EDGE: SEVERAL DAYS

## 2021-10-19 ASSESSMENT — PATIENT HEALTH QUESTIONNAIRE - PHQ9
5. POOR APPETITE OR OVEREATING: SEVERAL DAYS
SUM OF ALL RESPONSES TO PHQ QUESTIONS 1-9: 7

## 2021-10-19 NOTE — PROGRESS NOTES
Kandy is a 41 year old who is being evaluated via a billable video visit.      How would you like to obtain your AVS? MyChart  If the video visit is dropped, the invitation should be resent by: Text to cell phone: In chart  Will anyone else be joining your video visit? No      Video Start Time: 2:26 PM    Assessment & Plan     Uncontrolled hypertension  -blood pressure readings are better but not at goal  -need to rule out underlying sleep apnea  - SLEEP EVALUATION & MANAGEMENT REFERRAL - ADULT -  - hydrALAZINE (APRESOLINE) 25 MG tablet  Dispense: 180 tablet; Refill: 1  -continue with Toprol  mg daily, hydrochlorothiazide 25 mg daily, Amlodipine-Valsartan  mg daily  -since readings are still high, added Hydralazine 25 mg twice a day  -patient will send updated blood pressure readings via My Chart, if still high then needs Cardiology referral.      Current moderate episode of major depressive disorder without prior episode (H)  -symptoms are better  -PHQ and KAILA scores are improved  -continue current medication and refills provided   - escitalopram (LEXAPRO) 10 MG tablet  Dispense: 90 tablet; Refill: 1  - hydrOXYzine (ATARAX) 25 MG tablet  Dispense: 90 tablet; Refill: 1  -will be starting counseling later in the year  -increase physical activity    We discussed the treatment for anxiety and depression in detail.  The importance of a multi faceted approach in controlling symptoms was reviewed.  The benefits of cognitive behavioral therapy reviewed, benefits of exercise, and stress reduction also discussed.      Snoring  -sleep referral provided  -no past evaluation to rule out sleep apnea   - SLEEP EVALUATION & MANAGEMENT REFERRAL - ADULT -      Prescription drug management  30 minutes spent on the date of the encounter doing chart review, history and exam, documentation and further activities per the note     Return in about 6 months (around 4/19/2022) for Follow up, with me.    Sarah Bhatia MD  LAURIE    [facility] 41 [facility] who presents for the following health issues:    HPI     Hypertension Follow-up      Do you check your blood pressure regularly outside of the clinic? Yes     Are you following a low salt diet? Yes    Are your blood pressures ever more than 140 on the top number (systolic) OR more   than 90 on the bottom number (diastolic), for example 140/90? Yes     Blood pressure has been better 140-150 systolic, 80 diastolic  Has had a lot going on  No exercise since recent move  No use of CPAP  Snoring+   Sometimes does have a headache in the morning  No dry mouth       Depression Followup    How are you doing with your depression since your last visit? Improved     Are you having other symptoms that might be associated with depression? No    Have you had a significant life event?  OTHER: Recent move     Are you feeling anxious or having panic attacks?   No    Do you have any concerns with your use of alcohol or other drugs? No    Social History     Tobacco Use     Smoking status: Never Smoker     Smokeless tobacco: Never Used   Substance Use Topics     Alcohol use: Yes     Drug use: Never     PHQ 5/3/2021 9/14/2021 10/19/2021   PHQ-9 Total Score 19 23 7   Q9: Thoughts of better off dead/self-harm past 2 weeks Several days Not at all Not at all     KAILA-7 SCORE 5/3/2021 9/14/2021 10/19/2021   Total Score 21 19 11     Last PHQ-9 10/19/2021   1.  Little interest or pleasure in doing things 0   2.  Feeling down, depressed, or hopeless 1   3.  Trouble falling or staying asleep, or sleeping too much 0   4.  Feeling tired or having little energy 2   5.  Poor appetite or overeating 2   6.  Feeling bad about yourself 2   7.  Trouble concentrating 0   8.  Moving slowly or restless 0   Q9: Thoughts of better off dead/self-harm past 2 weeks 0   PHQ-9 Total Score 7   Difficulty at work, home, or with people Somewhat difficult     KAILA-7  10/19/2021   1.  Feeling nervous, anxious, or on edge 1   2. Not being able to stop or control worrying 2   3. Worrying too much about different things 2   4. Trouble relaxing 1   5. Being so restless that it is hard to sit still 1   6. Becoming easily annoyed or irritable 1   7. Feeling afraid, as if something awful might happen 3   KAILA-7 Total Score 11   If you checked any problems, how difficult have they made it for you to do your work, take care of things at home, or get along with other people? Very difficult       Suicide Assessment Five-step Evaluation and Treatment (SAFE-T)      No thoughts of self harm   Has counseling scheduled 12/21.      Review of Systems   Constitutional, HEENT, cardiovascular, pulmonary, gi and gu systems are negative, except as otherwise noted.      Objective    Vitals - Patient Reported  Systolic (Patient Reported): (!) 140  Diastolic (Patient Reported): 80      Vitals:  No vitals were obtained today due to virtual visit.    Physical Exam   GENERAL: Healthy, alert and no distress  EYES: Eyes grossly normal to inspection.  No discharge or erythema, or obvious scleral/conjunctival abnormalities.  RESP: No audible wheeze, cough, or visible cyanosis.  No visible retractions or increased work of breathing.    SKIN: Visible skin clear. No significant rash, abnormal pigmentation or lesions.  NEURO: Cranial nerves grossly intact.  Mentation and speech appropriate for age.  PSYCH: Mentation appears normal, affect normal/bright, judgement and insight intact, normal speech and appearance well-groomed.    No results found for this or any previous visit (from the past 24 hour(s)).            Video-Visit Details    Type of service:  Video Visit    Video End Time:2:47 PM     Originating Location (pt. Location): Home    Distant Location (provider location):  [facility]     Platform used for Video Visit: [employer]

## 2021-10-19 NOTE — PATIENT INSTRUCTIONS
Sleep Clinic   [address] [place] [id number]   Phone: [phone]   Fax: [phone]

## 2021-10-20 ASSESSMENT — ANXIETY QUESTIONNAIRES: GAD7 TOTAL SCORE: 11

## 2021-11-02 NOTE — PROGRESS NOTES
"  Assessment & Plan     Polyarthralgia  -await labs   -if no lab abnormalities and symptoms persist then consider Rheumatology referral   -X rays of the hands are not showing any bony abnormalities   - Uric acid  - Cyclic Citrullinated Peptide Antibody IgG  - Rheumatoid factor  - Anti Nuclear Dorcas IgG by IFA with Reflex  - Hepatic panel (Albumin, ALT, AST, Bili, Alk Phos, TP)  - Vitamin B12  - CRP inflammation  - Erythrocyte sedimentation rate auto  - Vitamin D Deficiency  - Ferritin  - CBC with Platelets & Differential    Current moderate episode of major depressive disorder without prior episode (H)  -stable on medication     Nail lesion  -hyperpigmented+  -differential include subungual hematoma, Pseudomonas nail infection, melanoma  -referral to DERM provided     Hypokalemia  -last Potassium was 3   - due for recheck   - Potassium    Benign essential hypertension  -blood pressure readings are better with recent medication changes     Snoring  -has appointment with Sleep scheduled    Need for hepatitis C screening test  - Hepatitis C Screen Reflex to HCV RNA Quant and Genotype      Ordering of each unique test  30 minutes spent on the date of the encounter doing chart review, history and exam, documentation and further activities per the note       BMI:   Estimated body mass index is 31.79 kg/m  as calculated from the following:    Height as of 5/3/21: 1.575 m (5' 2\").    Weight as of this encounter: 78.8 kg (173 lb 12.8 oz).     Return in about 4 weeks (around 12/1/2021) if symptoms worsen or fail to improve.    Sarah Bhatia MD MPH    [facility] 41 [facility] who presents for the following health issues:    HPI     Hypertension Follow-up      Do you check your blood pressure regularly outside of the clinic? No     Are you following a low salt diet? Yes    Are your blood pressures ever more than 140 on the top number (systolic) OR more   than 90 on the " bottom number (diastolic), for example 140/90? No      MOOD: Is OK  No thoughts of self harm   Sleep is better    Snoring:  Has sleep appointment     Joint pains:  -Thumb and index finger pain, finger pain started a few years ago and now in the last 2 months thumb is painful  -on going for several years  -may have pain in the toes now  -Is right hand dominant  -edema+  -has been having low back pain too  -some neck pain too  -stiffness+ last over 30 minutes  -can have stiffness at the end of the day  -fingers may stiffen out at times  -no paresthesias  -pain may keep awake at night   -x rays done 2020, reviewed through Care everywhere, has tried Aspercreme.       Toe pain and lesion:   -? History of skin cancer in grandma   -noticed 2 months ago  -no major change in size  -No trauma recalled  -no drainage  -no past history  -no personal or family history of melanoma     Review of Systems   Constitutional, HEENT, cardiovascular, pulmonary, gi and gu systems are negative, except as otherwise noted.      Objective    /85 (BP Location: Left arm, Patient Position: Sitting, Cuff Size: Adult Regular)   Pulse 51   Temp 98.4  F (36.9  C) (Tympanic)   Wt 78.8 kg (173 lb 12.8 oz)   SpO2 100%   BMI 31.79 kg/m    Body mass index is 31.79 kg/m .  Physical Exam   GENERAL APPEARANCE: healthy, alert and no distress  EYES: Eyes grossly normal to inspection and conjunctivae and sclerae normal  NECK: no adenopathy and trachea midline and normal to palpation  RESP: lungs clear to auscultation - no rales, rhonchi or wheezes  CV: regular rates and rhythm, normal S1 S2, no S3 or S4 and no murmur, click or rub  ABDOMEN: soft, non-tender and no rebound or guarding   MS: extremities normal- no gross deformities noted and peripheral pulses normal  SKIN: no suspicious lesions or rashes  NEURO: Normal strength and tone, mentation intact and speech normal  PSYCH: mentation appears normal  Musculo: Strength and range of motion of  elbows, knees intact  Hands: no gross deformities, mild edema and stiffness of right thumb and index finger  Right big toenail: hyperpigmented lesion noted adjacent to the lateral nail margin, slight green discoloration.     XR HAND BILATERAL G/E 3 VIEWS   11/3/2021 10:57 AM      HISTORY:  Polyarthralgia                                                                      IMPRESSION: Unremarkable exam.     JUSTIN MONTES MD

## 2021-11-03 ENCOUNTER — OFFICE VISIT (OUTPATIENT)
Dept: FAMILY MEDICINE | Facility: CLINIC | Age: 41
End: 2021-11-03
Payer: COMMERCIAL

## 2021-11-03 ENCOUNTER — ANCILLARY PROCEDURE (OUTPATIENT)
Dept: GENERAL RADIOLOGY | Facility: CLINIC | Age: 41
End: 2021-11-03
Attending: PREVENTIVE MEDICINE
Payer: COMMERCIAL

## 2021-11-03 VITALS
BODY MASS INDEX: 31.79 KG/M2 | SYSTOLIC BLOOD PRESSURE: 132 MMHG | TEMPERATURE: 98.4 F | DIASTOLIC BLOOD PRESSURE: 85 MMHG | OXYGEN SATURATION: 100 % | WEIGHT: 173.8 LBS | HEART RATE: 51 BPM

## 2021-11-03 DIAGNOSIS — R06.83 SNORING: ICD-10-CM

## 2021-11-03 DIAGNOSIS — L60.9 NAIL LESION: ICD-10-CM

## 2021-11-03 DIAGNOSIS — Z11.59 NEED FOR HEPATITIS C SCREENING TEST: ICD-10-CM

## 2021-11-03 DIAGNOSIS — I10 BENIGN ESSENTIAL HYPERTENSION: ICD-10-CM

## 2021-11-03 DIAGNOSIS — M25.50 POLYARTHRALGIA: Primary | ICD-10-CM

## 2021-11-03 DIAGNOSIS — M25.50 POLYARTHRALGIA: ICD-10-CM

## 2021-11-03 DIAGNOSIS — E87.6 HYPOKALEMIA: ICD-10-CM

## 2021-11-03 DIAGNOSIS — F32.1 CURRENT MODERATE EPISODE OF MAJOR DEPRESSIVE DISORDER WITHOUT PRIOR EPISODE (H): ICD-10-CM

## 2021-11-03 LAB
ALBUMIN SERPL-MCNC: 3.8 G/DL (ref 3.4–5)
ALP SERPL-CCNC: 98 U/L (ref 40–150)
ALT SERPL W P-5'-P-CCNC: 23 U/L (ref 0–50)
AST SERPL W P-5'-P-CCNC: 12 U/L (ref 0–45)
BASOPHILS # BLD AUTO: 0 10E3/UL (ref 0–0.2)
BASOPHILS NFR BLD AUTO: 0 %
BILIRUB DIRECT SERPL-MCNC: <0.1 MG/DL (ref 0–0.2)
BILIRUB SERPL-MCNC: 0.3 MG/DL (ref 0.2–1.3)
CRP SERPL-MCNC: <2.9 MG/L (ref 0–8)
EOSINOPHIL # BLD AUTO: 0 10E3/UL (ref 0–0.7)
EOSINOPHIL NFR BLD AUTO: 0 %
ERYTHROCYTE [DISTWIDTH] IN BLOOD BY AUTOMATED COUNT: 13.1 % (ref 10–15)
ERYTHROCYTE [SEDIMENTATION RATE] IN BLOOD BY WESTERGREN METHOD: 10 MM/HR (ref 0–20)
FERRITIN SERPL-MCNC: 15 NG/ML (ref 12–150)
HCT VFR BLD AUTO: 36.3 % (ref 35–47)
HGB BLD-MCNC: 11.9 G/DL (ref 11.7–15.7)
IMM GRANULOCYTES # BLD: 0 10E3/UL
IMM GRANULOCYTES NFR BLD: 0 %
LYMPHOCYTES # BLD AUTO: 2.3 10E3/UL (ref 0.8–5.3)
LYMPHOCYTES NFR BLD AUTO: 42 %
MCH RBC QN AUTO: 25.4 PG (ref 26.5–33)
MCHC RBC AUTO-ENTMCNC: 32.8 G/DL (ref 31.5–36.5)
MCV RBC AUTO: 78 FL (ref 78–100)
MONOCYTES # BLD AUTO: 0.2 10E3/UL (ref 0–1.3)
MONOCYTES NFR BLD AUTO: 4 %
NEUTROPHILS # BLD AUTO: 3 10E3/UL (ref 1.6–8.3)
NEUTROPHILS NFR BLD AUTO: 53 %
PLATELET # BLD AUTO: 295 10E3/UL (ref 150–450)
POTASSIUM BLD-SCNC: 3.3 MMOL/L (ref 3.4–5.3)
PROT SERPL-MCNC: 7.3 G/DL (ref 6.8–8.8)
RBC # BLD AUTO: 4.68 10E6/UL (ref 3.8–5.2)
URATE SERPL-MCNC: 8.6 MG/DL (ref 2.6–6)
VIT B12 SERPL-MCNC: 532 PG/ML (ref 193–986)
WBC # BLD AUTO: 5.6 10E3/UL (ref 4–11)

## 2021-11-03 PROCEDURE — 84550 ASSAY OF BLOOD/URIC ACID: CPT | Performed by: PREVENTIVE MEDICINE

## 2021-11-03 PROCEDURE — 84132 ASSAY OF SERUM POTASSIUM: CPT | Performed by: PREVENTIVE MEDICINE

## 2021-11-03 PROCEDURE — 85025 COMPLETE CBC W/AUTO DIFF WBC: CPT | Performed by: PREVENTIVE MEDICINE

## 2021-11-03 PROCEDURE — 86038 ANTINUCLEAR ANTIBODIES: CPT | Performed by: PREVENTIVE MEDICINE

## 2021-11-03 PROCEDURE — 82607 VITAMIN B-12: CPT | Performed by: PREVENTIVE MEDICINE

## 2021-11-03 PROCEDURE — 85652 RBC SED RATE AUTOMATED: CPT | Performed by: PREVENTIVE MEDICINE

## 2021-11-03 PROCEDURE — 86140 C-REACTIVE PROTEIN: CPT | Performed by: PREVENTIVE MEDICINE

## 2021-11-03 PROCEDURE — 36415 COLL VENOUS BLD VENIPUNCTURE: CPT | Performed by: PREVENTIVE MEDICINE

## 2021-11-03 PROCEDURE — 73130 X-RAY EXAM OF HAND: CPT | Mod: LT | Performed by: RADIOLOGY

## 2021-11-03 PROCEDURE — 82728 ASSAY OF FERRITIN: CPT | Performed by: PREVENTIVE MEDICINE

## 2021-11-03 PROCEDURE — 86803 HEPATITIS C AB TEST: CPT | Performed by: PREVENTIVE MEDICINE

## 2021-11-03 PROCEDURE — 80076 HEPATIC FUNCTION PANEL: CPT | Performed by: PREVENTIVE MEDICINE

## 2021-11-03 PROCEDURE — 82306 VITAMIN D 25 HYDROXY: CPT | Performed by: PREVENTIVE MEDICINE

## 2021-11-03 PROCEDURE — 86431 RHEUMATOID FACTOR QUANT: CPT | Performed by: PREVENTIVE MEDICINE

## 2021-11-03 PROCEDURE — 86200 CCP ANTIBODY: CPT | Performed by: PREVENTIVE MEDICINE

## 2021-11-03 PROCEDURE — 99214 OFFICE O/P EST MOD 30 MIN: CPT | Performed by: PREVENTIVE MEDICINE

## 2021-11-03 NOTE — RESULT ENCOUNTER NOTE
Arthena,     Basic blood count is not showing anemia or infection.  Marker of inflammation ESR is normal.  Other labs are pending.     Please do not hesitate to call us at [phone] if you have any questions or concerns.    Thank you,    Sarah Bhatia MD MPH

## 2021-11-03 NOTE — RESULT ENCOUNTER NOTE
Arthena,    X rays of the hands did not show any bony abnormalities.     Please do not hesitate to call us at [phone] if you have any questions or concerns.    Thank you,    Sarah Bhatia MD MPH

## 2021-11-04 ENCOUNTER — MYC MEDICAL ADVICE (OUTPATIENT)
Dept: FAMILY MEDICINE | Facility: CLINIC | Age: 41
End: 2021-11-04

## 2021-11-04 DIAGNOSIS — E55.9 VITAMIN D DEFICIENCY: Primary | ICD-10-CM

## 2021-11-04 DIAGNOSIS — M25.50 POLYARTHRALGIA: ICD-10-CM

## 2021-11-04 LAB
ANA SER QL IF: NEGATIVE
CCP AB SER IA-ACNC: 2.1 U/ML
DEPRECATED CALCIDIOL+CALCIFEROL SERPL-MC: 14 UG/L (ref 20–75)
HCV AB SERPL QL IA: NONREACTIVE
RHEUMATOID FACT SER NEPH-ACNC: <7 IU/ML

## 2021-11-06 NOTE — RESULT ENCOUNTER NOTE
Arthena,     Your vitamin D level was low.  Low levels can cause fatigue and joint pains. I recommend starting high dose vitamin D.  I have sent a prescription for vitamin D 50,000IU to your pharmacy for you to . You should take this once weekly for 8 weeks, then take over the counter Vitamin D3 at a dose of 2000 units daily. We will recheck labs in 3 months.     JONO test for auto immune disease was negative.  Tests for Rheumatoid arthritis are negative.  Screening test for Hepatitis C is negative.  Vitamin B 12, iron stores, liver function tests, and CRP marker of inflammation are normal.  Uric acid levels are high but your clinical picture is not suggestive of gout. Most people with high uric acid levels don't have any symptoms or related problems.  Potassium is better, increase intake of potassium rich foods such as green veggies, bananas, oranges and avocados.     Please do not hesitate to call us at [phone] if you have any questions or concerns.    Thank you,    Sarah Bhatia MD MPH

## 2021-11-08 ENCOUNTER — TELEPHONE (OUTPATIENT)
Dept: BEHAVIORAL HEALTH | Facility: CLINIC | Age: 41
End: 2021-11-08
Payer: COMMERCIAL

## 2021-11-10 NOTE — TELEPHONE ENCOUNTER
Pt's forms still at the . These have been mailed to the pt's home address on file.    Thank you,  Jada Joel,   [facility]

## 2021-11-20 ENCOUNTER — E-VISIT (OUTPATIENT)
Dept: FAMILY MEDICINE | Facility: CLINIC | Age: 41
End: 2021-11-20
Payer: COMMERCIAL

## 2021-11-20 DIAGNOSIS — Z20.822 SUSPECTED COVID-19 VIRUS INFECTION: Primary | ICD-10-CM

## 2021-11-20 PROCEDURE — 99421 OL DIG E/M SVC 5-10 MIN: CPT | Performed by: PREVENTIVE MEDICINE

## 2021-11-22 ENCOUNTER — E-VISIT (OUTPATIENT)
Dept: FAMILY MEDICINE | Facility: CLINIC | Age: 41
End: 2021-11-22
Payer: COMMERCIAL

## 2021-11-22 ENCOUNTER — LAB (OUTPATIENT)
Dept: URGENT CARE | Facility: URGENT CARE | Age: 41
End: 2021-11-22
Attending: PREVENTIVE MEDICINE
Payer: COMMERCIAL

## 2021-11-22 DIAGNOSIS — Z20.822 SUSPECTED COVID-19 VIRUS INFECTION: ICD-10-CM

## 2021-11-22 DIAGNOSIS — Z20.822 SUSPECTED COVID-19 VIRUS INFECTION: Primary | ICD-10-CM

## 2021-11-22 PROCEDURE — 99207 PR NON-BILLABLE SERV PER CHARTING: CPT | Performed by: PREVENTIVE MEDICINE

## 2021-11-22 PROCEDURE — U0005 INFEC AGEN DETEC AMPLI PROBE: HCPCS

## 2021-11-22 PROCEDURE — U0003 INFECTIOUS AGENT DETECTION BY NUCLEIC ACID (DNA OR RNA); SEVERE ACUTE RESPIRATORY SYNDROME CORONAVIRUS 2 (SARS-COV-2) (CORONAVIRUS DISEASE [COVID-19]), AMPLIFIED PROBE TECHNIQUE, MAKING USE OF HIGH THROUGHPUT TECHNOLOGIES AS DESCRIBED BY CMS-2020-01-R: HCPCS

## 2021-11-22 NOTE — PATIENT INSTRUCTIONS
Thank you for choosing us for your care. I think an in-clinic visit would be best next steps based on your symptoms. Please schedule a clinic appointment; you won t be charged for this eVisit.      You can schedule an appointment right here in [facility], or call [phone]

## 2021-11-22 NOTE — PATIENT INSTRUCTIONS
Dear Kandy Rosales,    Your symptoms show that you may have coronavirus (COVID-19). This illness can cause fever, cough and trouble breathing. Many people get a mild case and get better on their own. Some people can get very sick.    Will I be tested for COVID-19?  We would like to test you for Covid-19 virus. I have placed orders for this test.     To schedule: go to your [employer] home page and scroll down to the section that says  You have an appointment that needs to be scheduled  and click the large green button that says  Schedule Now  and follow the steps to find the next available openings.    If you are unable to complete these [employer] scheduling steps, please call [phone] to schedule your testing.     Return to work/school/ guidance:  Please let your workplace manager and staffing office know when your quarantine ends     We can t give you an exact date as it depends on the above. You can calculate this on your own or work with your manager/staffing office to calculate this. (For example if you were exposed on 10/4, you would have to quarantine for 14 full days. That would be through 10/18. You could return on 10/19.)      If you receive a positive COVID-19 test result, follow the guidance of the those who are giving you the results. Usually the return to work is 10 (or in some cases 20 days from symptom onset.) If you work at [facility] [employer], you must also be cleared by Employee Occupational Health and Safety to return to work.        If you receive a negative COVID-19 test result and did not have a high risk exposure to someone with a known positive COVID-19 test, you can return to work once you're free of fever for 24 hours without fever-reducing medication and your symptoms are improving or resolved.      If you receive a negative COVID-19 test and If you had a high risk exposure to someone who has tested positive for COVID-19 then you can return to work 14 days after your last contact  with the positive individual    Note: If you have ongoing exposure to the covid positive person, this quarantine period may be more than 14 days. (For example, if you are continued to be exposed to your child who tested positive and cannot isolate from them, then the quarantine of 7-14 days can't start until your child is no longer contagious. This is typically 10 days from onset of the child's symptoms. So the total duration may be 17-24 days in this case.)    Sign up for [employer].   We know it's scary to hear that you might have COVID-19. We want to track your symptoms to make sure you're okay over the next 2 weeks. Please look for an email from [employer]--this is a free, online program that we'll use to keep in touch. To sign up, follow the link in the email you will receive. Learn more at [email or web address]    How can I take care of myself?    Get lots of rest. Drink extra fluids (unless a doctor has told you not to)    Take Tylenol (acetaminophen) or ibuprofen for fever or pain. If you have liver or kidney problems, ask your family doctor if it's okay to take Tylenol o ibuprofen    If you have other health problems (like cancer, heart failure, an organ transplant or severe kidney disease): Call your specialty clinic if you don't feel better in the next 2 days.    Know when to call 911. Emergency warning signs include:  o Trouble breathing or shortness of breath  o Pain or pressure in the chest that doesn't go away  o Feeling confused like you haven't felt before, or not being able to wake up  o Bluish-colored lips or face    Where can I get more information?  [employer] [place] - About COVID-19:   [email or web address]    CDC - What to Do If You're Sick:   www.cdc.gov/coronavirus/2019-ncov/about/steps-when-sick.html

## 2021-11-23 ENCOUNTER — OFFICE VISIT (OUTPATIENT)
Dept: FAMILY MEDICINE | Facility: CLINIC | Age: 41
End: 2021-11-23
Payer: COMMERCIAL

## 2021-11-23 VITALS
OXYGEN SATURATION: 100 % | HEART RATE: 77 BPM | SYSTOLIC BLOOD PRESSURE: 160 MMHG | RESPIRATION RATE: 16 BRPM | WEIGHT: 176 LBS | TEMPERATURE: 98.6 F | DIASTOLIC BLOOD PRESSURE: 95 MMHG | BODY MASS INDEX: 32.19 KG/M2

## 2021-11-23 DIAGNOSIS — I10 BENIGN ESSENTIAL HYPERTENSION: ICD-10-CM

## 2021-11-23 DIAGNOSIS — J20.9 ACUTE BRONCHITIS WITH SYMPTOMS > 10 DAYS: Primary | ICD-10-CM

## 2021-11-23 LAB — SARS-COV-2 RNA RESP QL NAA+PROBE: NEGATIVE

## 2021-11-23 PROCEDURE — 99213 OFFICE O/P EST LOW 20 MIN: CPT | Performed by: FAMILY MEDICINE

## 2021-11-23 RX ORDER — BENZONATATE 200 MG/1
200 CAPSULE ORAL 3 TIMES DAILY PRN
Qty: 21 CAPSULE | Refills: 3 | Status: SHIPPED | OUTPATIENT
Start: 2021-11-23 | End: 2022-10-11

## 2021-11-23 RX ORDER — AZITHROMYCIN 250 MG/1
TABLET, FILM COATED ORAL
Qty: 6 TABLET | Refills: 0 | Status: SHIPPED | OUTPATIENT
Start: 2021-11-23 | End: 2021-11-28

## 2021-11-23 ASSESSMENT — PATIENT HEALTH QUESTIONNAIRE - PHQ9
10. IF YOU CHECKED OFF ANY PROBLEMS, HOW DIFFICULT HAVE THESE PROBLEMS MADE IT FOR YOU TO DO YOUR WORK, TAKE CARE OF THINGS AT HOME, OR GET ALONG WITH OTHER PEOPLE: EXTREMELY DIFFICULT
SUM OF ALL RESPONSES TO PHQ QUESTIONS 1-9: 18
SUM OF ALL RESPONSES TO PHQ QUESTIONS 1-9: 18

## 2021-11-23 ASSESSMENT — ENCOUNTER SYMPTOMS
SORE THROAT: 1
COUGH: 1
HEADACHES: 1

## 2021-11-23 ASSESSMENT — PAIN SCALES - GENERAL: PAINLEVEL: EXTREME PAIN (8)

## 2021-11-23 NOTE — PROGRESS NOTES
Subjective     Kandy Rosales is a 41 year old female who presents to clinic today for the following health issues:    Cough  Associated symptoms include headaches and sore throat.   Pharyngitis   Associated symptoms include headaches and cough.   Headache     History of Present Illness       She eats 2-3 servings of fruits and vegetables daily.She consumes 2 sweetened beverage(s) daily.She exercises with enough effort to increase her heart rate 9 or less minutes per day.  She exercises with enough effort to increase her heart rate 3 or less days per week. She is missing 2 dose(s) of medications per week.     Patient stated he kids were sick over a week ago. They all tested negative for COVID-19. Patient stated to have symptoms 1 week ago Monday. She tested negative as well. Patient stated the cough has become more productive. Patient has more coughing when laying down as well. No fever, sob. No cp. No n/v/d. No loss of taste or smell.      Review of Systems   HENT: Positive for sore throat.    Respiratory: Positive for cough.    Neurological: Positive for headaches.      Constitutional, HEENT, cardiovascular, pulmonary, GI, , musculoskeletal, neuro, skin, endocrine and psych systems are negative, except as otherwise noted.      Objective    BP (!) 160/95   Pulse 77   Temp 98.6  F (37  C) (Tympanic)   Resp 16   Wt 79.8 kg (176 lb)   SpO2 100%   Breastfeeding No   BMI 32.19 kg/m    Body mass index is 32.19 kg/m .  Physical Exam   GENERAL: healthy, alert and no distress  NECK: no adenopathy, no asymmetry, masses, or scars and thyroid normal to palpation  RESP: lungs clear to auscultation - no rales, rhonchi or wheezes  CV: regular rate and rhythm, normal S1 S2, no S3 or S4, no murmur, click or rub, no peripheral edema and peripheral pulses strong  ABDOMEN: soft, nontender, no hepatosplenomegaly, no masses and bowel sounds normal  MS: no gross musculoskeletal defects noted, no edema    A/P:  (J20.9)  Acute bronchitis with symptoms > 10 days  (primary encounter diagnosis)  Comment:   Plan: azithromycin (ZITHROMAX) 250 MG tablet,         benzonatate (TESSALON) 200 MG capsule        Worsening per patient. abx given with tessalon as needed. If no improvements in 2 weeks, patient will RTC for recheck.    (I10) Benign essential hypertension  Comment:   Plan: not controlled. Patient stated she has not been taking any medications while sick. Discussed start taking. Patient should f/u in 1 month for recheck.    Jordan Melton MD        Answers for HPI/ROS submitted by the patient on 11/23/2021  If you checked off any problems, how difficult have these problems made it for you to do your work, take care of things at home, or get along with other people?: Extremely difficult  PHQ9 TOTAL SCORE: 18

## 2021-11-23 NOTE — RESULT ENCOUNTER NOTE
Arthena, your test results were within normal limits. Covid test was negative.     Please do not hesitate to call us at [phone] if you have any questions or concerns.    Thank you,    Sarah Bhatia MD MPH

## 2021-11-24 ASSESSMENT — PATIENT HEALTH QUESTIONNAIRE - PHQ9: SUM OF ALL RESPONSES TO PHQ QUESTIONS 1-9: 18

## 2021-11-26 DIAGNOSIS — M25.50 POLYARTHRALGIA: ICD-10-CM

## 2021-11-26 DIAGNOSIS — E55.9 VITAMIN D DEFICIENCY: ICD-10-CM

## 2021-11-26 NOTE — TELEPHONE ENCOUNTER
"Requested Prescriptions   Pending Prescriptions Disp Refills    D3-50 1.25 MG (28332 UT) capsule [Pharmacy Med Name: VITAMIN D3-50 50,000 UNIT CAP] 4 capsule 1     Sig: TAKE 1 CAPSULE (50,000 UNITS) BY MOUTH EVERY 7 DAYS FOR 8 DOSES        Vitamin Supplements (Adult) Protocol Failed - 11/26/2021  1:30 PM        Failed - High dose Vitamin D not ordered        Passed - Recent (12 mo) or future (30 days) visit within the authorizing provider's specialty     Patient has had an office visit with the authorizing provider or a provider within the authorizing providers department within the previous 12 mos or has a future within next 30 days. See \"Patient Info\" tab in inbasket, or \"Choose Columns\" in Meds & Orders section of the refill encounter.              Passed - Medication is active on med list              "

## 2021-11-29 RX ORDER — METHOCARBAMOL 750 MG/1
TABLET ORAL
Qty: 4 CAPSULE | Refills: 1 | Status: SHIPPED | OUTPATIENT
Start: 2021-11-29 | End: 2022-10-11

## 2021-12-14 DIAGNOSIS — M25.50 POLYARTHRALGIA: ICD-10-CM

## 2021-12-14 DIAGNOSIS — E55.9 VITAMIN D DEFICIENCY: ICD-10-CM

## 2021-12-14 NOTE — TELEPHONE ENCOUNTER
Pharmacy faxing request for this to be changed to a 90  Day supply    D3-50 1.25 MG (60317 UT) capsule 4 capsule 1 11/29/2021

## 2021-12-15 NOTE — TELEPHONE ENCOUNTER
Routing refill request to provider for review/approval because:      Vitamin Supplements (Adult) Protocol Failed 12/14/2021 10:25 AM   Protocol Details  High dose Vitamin D not ordered    Recent (12 mo) or future (30 days) visit within the authorizing provider's specialty    Medication is active on med list         Shanna Lewis RN  [facility]

## 2021-12-16 RX ORDER — CHOLECALCIFEROL (VITAMIN D3) 1250 MCG
CAPSULE ORAL
Qty: 4 CAPSULE | Refills: 1 | OUTPATIENT
Start: 2021-12-16

## 2021-12-16 NOTE — TELEPHONE ENCOUNTER
Does not need further high dose once completed the initial script. She can take over the counter Vitamin D 3 in a dose of 2000 units daily for maintenance. Thank you, Sarah Bhatia MD MPH

## 2021-12-20 PROBLEM — E66.09 CLASS 1 OBESITY DUE TO EXCESS CALORIES WITHOUT SERIOUS COMORBIDITY WITH BODY MASS INDEX (BMI) OF 31.0 TO 31.9 IN ADULT: Chronic | Status: ACTIVE | Noted: 2021-12-20

## 2021-12-20 PROBLEM — E66.811 CLASS 1 OBESITY DUE TO EXCESS CALORIES WITHOUT SERIOUS COMORBIDITY WITH BODY MASS INDEX (BMI) OF 31.0 TO 31.9 IN ADULT: Chronic | Status: ACTIVE | Noted: 2021-12-20

## 2022-01-05 DIAGNOSIS — I10 BENIGN ESSENTIAL HYPERTENSION: ICD-10-CM

## 2022-01-06 RX ORDER — METOPROLOL SUCCINATE 100 MG/1
TABLET, EXTENDED RELEASE ORAL
Qty: 90 TABLET | Refills: 0 | Status: SHIPPED | OUTPATIENT
Start: 2022-01-06

## 2022-01-07 ENCOUNTER — TELEPHONE (OUTPATIENT)
Dept: BEHAVIORAL HEALTH | Facility: CLINIC | Age: 42
End: 2022-01-07
Payer: COMMERCIAL

## 2022-01-23 ENCOUNTER — E-VISIT (OUTPATIENT)
Dept: FAMILY MEDICINE | Facility: CLINIC | Age: 42
End: 2022-01-23
Payer: COMMERCIAL

## 2022-01-23 DIAGNOSIS — R11.0 NAUSEA: Primary | ICD-10-CM

## 2022-01-23 PROCEDURE — 99421 OL DIG E/M SVC 5-10 MIN: CPT | Performed by: PREVENTIVE MEDICINE

## 2022-01-24 RX ORDER — ONDANSETRON 4 MG/1
4 TABLET, ORALLY DISINTEGRATING ORAL EVERY 8 HOURS PRN
Qty: 12 TABLET | Refills: 0 | Status: SHIPPED | OUTPATIENT
Start: 2022-01-24 | End: 2022-10-11

## 2022-01-24 NOTE — PATIENT INSTRUCTIONS
Thank you for choosing us for your care. I have placed an order for a prescription so that you can start treatment. View your full visit summary for details by clicking on the link below. Your pharmacist will able to address any questions you may have about the medication.     Please follow up if not better in 3 days.    E visits submitted over the weekend are not addressed till the first business day.

## 2022-06-03 ENCOUNTER — OFFICE VISIT (OUTPATIENT)
Dept: FAMILY MEDICINE | Facility: CLINIC | Age: 42
End: 2022-06-03
Payer: COMMERCIAL

## 2022-06-03 ENCOUNTER — ANCILLARY PROCEDURE (OUTPATIENT)
Dept: GENERAL RADIOLOGY | Facility: CLINIC | Age: 42
End: 2022-06-03
Attending: NURSE PRACTITIONER
Payer: COMMERCIAL

## 2022-06-03 VITALS
HEART RATE: 72 BPM | TEMPERATURE: 98.9 F | OXYGEN SATURATION: 97 % | RESPIRATION RATE: 18 BRPM | HEIGHT: 63 IN | WEIGHT: 181.9 LBS | BODY MASS INDEX: 32.23 KG/M2 | SYSTOLIC BLOOD PRESSURE: 144 MMHG | DIASTOLIC BLOOD PRESSURE: 86 MMHG

## 2022-06-03 DIAGNOSIS — M79.671 RIGHT FOOT PAIN: Primary | ICD-10-CM

## 2022-06-03 DIAGNOSIS — M79.671 RIGHT FOOT PAIN: ICD-10-CM

## 2022-06-03 DIAGNOSIS — F32.1 CURRENT MODERATE EPISODE OF MAJOR DEPRESSIVE DISORDER WITHOUT PRIOR EPISODE (H): Chronic | ICD-10-CM

## 2022-06-03 LAB
ANION GAP SERPL CALCULATED.3IONS-SCNC: 6 MMOL/L (ref 3–14)
BUN SERPL-MCNC: 17 MG/DL (ref 7–30)
CALCIUM SERPL-MCNC: 9.6 MG/DL (ref 8.5–10.1)
CHLORIDE BLD-SCNC: 104 MMOL/L (ref 94–109)
CO2 SERPL-SCNC: 27 MMOL/L (ref 20–32)
CREAT SERPL-MCNC: 0.83 MG/DL (ref 0.52–1.04)
CRP SERPL-MCNC: <2.9 MG/L (ref 0–8)
ERYTHROCYTE [DISTWIDTH] IN BLOOD BY AUTOMATED COUNT: 13.2 % (ref 10–15)
ERYTHROCYTE [SEDIMENTATION RATE] IN BLOOD BY WESTERGREN METHOD: 11 MM/HR (ref 0–20)
GFR SERPL CREATININE-BSD FRML MDRD: 90 ML/MIN/1.73M2
GLUCOSE BLD-MCNC: 105 MG/DL (ref 70–99)
HCT VFR BLD AUTO: 39.5 % (ref 35–47)
HGB BLD-MCNC: 12.9 G/DL (ref 11.7–15.7)
MCH RBC QN AUTO: 25.1 PG (ref 26.5–33)
MCHC RBC AUTO-ENTMCNC: 32.7 G/DL (ref 31.5–36.5)
MCV RBC AUTO: 77 FL (ref 78–100)
PLATELET # BLD AUTO: 336 10E3/UL (ref 150–450)
POTASSIUM BLD-SCNC: 3.7 MMOL/L (ref 3.4–5.3)
RBC # BLD AUTO: 5.13 10E6/UL (ref 3.8–5.2)
SODIUM SERPL-SCNC: 137 MMOL/L (ref 133–144)
URATE SERPL-MCNC: 6.4 MG/DL (ref 2.6–6)
WBC # BLD AUTO: 7.5 10E3/UL (ref 4–11)

## 2022-06-03 PROCEDURE — 73630 X-RAY EXAM OF FOOT: CPT | Mod: TC | Performed by: FAMILY MEDICINE

## 2022-06-03 PROCEDURE — 80048 BASIC METABOLIC PNL TOTAL CA: CPT | Performed by: NURSE PRACTITIONER

## 2022-06-03 PROCEDURE — 99214 OFFICE O/P EST MOD 30 MIN: CPT | Performed by: NURSE PRACTITIONER

## 2022-06-03 PROCEDURE — 84550 ASSAY OF BLOOD/URIC ACID: CPT | Performed by: NURSE PRACTITIONER

## 2022-06-03 PROCEDURE — 86140 C-REACTIVE PROTEIN: CPT | Performed by: NURSE PRACTITIONER

## 2022-06-03 PROCEDURE — 36415 COLL VENOUS BLD VENIPUNCTURE: CPT | Performed by: NURSE PRACTITIONER

## 2022-06-03 PROCEDURE — 85027 COMPLETE CBC AUTOMATED: CPT | Performed by: NURSE PRACTITIONER

## 2022-06-03 PROCEDURE — 96127 BRIEF EMOTIONAL/BEHAV ASSMT: CPT | Performed by: NURSE PRACTITIONER

## 2022-06-03 PROCEDURE — 85652 RBC SED RATE AUTOMATED: CPT | Performed by: NURSE PRACTITIONER

## 2022-06-03 RX ORDER — ESCITALOPRAM OXALATE 20 MG/1
20 TABLET ORAL DAILY
Qty: 90 TABLET | Refills: 0 | Status: SHIPPED | OUTPATIENT
Start: 2022-06-03 | End: 2022-08-31

## 2022-06-03 RX ORDER — NAPROXEN 500 MG/1
500 TABLET ORAL 2 TIMES DAILY PRN
Qty: 30 TABLET | Refills: 1 | Status: SHIPPED | OUTPATIENT
Start: 2022-06-03 | End: 2022-10-11

## 2022-06-03 ASSESSMENT — PATIENT HEALTH QUESTIONNAIRE - PHQ9
SUM OF ALL RESPONSES TO PHQ QUESTIONS 1-9: 14
10. IF YOU CHECKED OFF ANY PROBLEMS, HOW DIFFICULT HAVE THESE PROBLEMS MADE IT FOR YOU TO DO YOUR WORK, TAKE CARE OF THINGS AT HOME, OR GET ALONG WITH OTHER PEOPLE: SOMEWHAT DIFFICULT
SUM OF ALL RESPONSES TO PHQ QUESTIONS 1-9: 14

## 2022-06-03 ASSESSMENT — PAIN SCALES - GENERAL: PAINLEVEL: SEVERE PAIN (6)

## 2022-06-03 NOTE — RESULT ENCOUNTER NOTE
Jimbo Rosales,    Attached are your test results.  -Normal red blood cell (hgb) levels, normal white blood cell count and normal platelet levels.   Please contact us if you have any questions.    Madiha Mcdowell, CNP

## 2022-06-03 NOTE — PATIENT INSTRUCTIONS
PLAN:   1.   Symptomatic therapy suggested: Increase lexapro to 20 mg a day   Will start on naprosyn twice a day for foot pain.  apply ice packs, prescription for NSAID given    2.  Orders Placed This Encounter   Medications    escitalopram (LEXAPRO) 20 MG tablet     Sig: Take 1 tablet (20 mg) by mouth daily     Dispense:  90 tablet     Refill:  0     Orders Placed This Encounter   Procedures    REVIEW OF HEALTH MAINTENANCE PROTOCOL ORDERS    CBC with platelets    Basic metabolic panel    CRP inflammation    Erythrocyte sedimentation rate auto    Uric acid       3. Patient needs to follow up in if no improvement,or sooner if worsening of symptoms or other symptoms develop.  Will follow up and/or notify patient of  results via My Chart to determine further need for followup  Follow up in 1 month with Dr May

## 2022-06-03 NOTE — PROGRESS NOTES
Carmen Gaitan is a 42 year old who presents for the following health issues     History of Present Illness       Reason for visit:  Pain in toe  Symptom onset:  3-7 days ago    She eats 2-3 servings of fruits and vegetables daily.She consumes 2 sweetened beverage(s) daily.She exercises with enough effort to increase her heart rate 30 to 60 minutes per day.  She exercises with enough effort to increase her heart rate 3 or less days per week. She is missing 1 dose(s) of medications per week.    Today's PHQ-9         PHQ-9 Total Score: 14    PHQ-9 Q9 Thoughts of better off dead/self-harm past 2 weeks :   Not at all    How difficult have these problems made it for you to do your work, take care of things at home, or get along with other people: Somewhat difficult       Joint or Musculoskeletal Pain  Duration of complaint: right foot pain at right great toe joint for about 2weeks   Description:   Location: right foot   Character: Dull ache  Intensity: moderate, 8/10  Progression of Symptoms: same  Accompanying Signs & Symptoms: Other symptoms: swelling  History: Previous similar pain: YES- had pain in that foot before about last month but was not seen and it went away    Precipitating factors: Trauma or overuse: no  Alleviating factors: Improved by: rest/inactivity, heat, ice and acetaminophen  Therapies Tried and outcome: Tylenol     Depression and Anxiety Follow-Up    How are you doing with your depression since your last visit? Improved on lexapro     How are you doing with your anxiety since your last visit?  Improved     Are you having other symptoms that might be associated with depression or anxiety? No    Have you had a significant life event? No     Do you have any concerns with your use of alcohol or other drugs? No    Social History     Tobacco Use     Smoking status: Never Smoker     Smokeless tobacco: Never Used   Vaping Use     Vaping Use: Never used   Substance Use Topics     Alcohol use: Yes      Drug use: Never     PHQ 10/19/2021 11/23/2021 6/3/2022   PHQ-9 Total Score 7 18 14   Q9: Thoughts of better off dead/self-harm past 2 weeks Not at all Not at all Not at all     KAILA-7 SCORE 5/3/2021 9/14/2021 10/19/2021   Total Score 21 19 11     Last PHQ-9 6/3/2022   1.  Little interest or pleasure in doing things 1   2.  Feeling down, depressed, or hopeless 1   3.  Trouble falling or staying asleep, or sleeping too much 3   4.  Feeling tired or having little energy 3   5.  Poor appetite or overeating 3   6.  Feeling bad about yourself 1   7.  Trouble concentrating 1   8.  Moving slowly or restless 1   Q9: Thoughts of better off dead/self-harm past 2 weeks 0   PHQ-9 Total Score 14   Difficulty at work, home, or with people -     KAILA-7  10/19/2021   1. Feeling nervous, anxious, or on edge 1   2. Not being able to stop or control worrying 2   3. Worrying too much about different things 2   4. Trouble relaxing 1   5. Being so restless that it is hard to sit still 1   6. Becoming easily annoyed or irritable 1   7. Feeling afraid, as if something awful might happen 3   KAILA-7 Total Score 11   If you checked any problems, how difficult have they made it for you to do your work, take care of things at home, or get along with other people? Very difficult       Suicide Assessment Five-step Evaluation and Treatment (SAFE-T)    Lab work is in process  Labs reviewed in EPIC  BP Readings from Last 3 Encounters:   06/03/22 (!) 144/86   11/23/21 (!) 160/95   11/03/21 132/85    Wt Readings from Last 3 Encounters:   06/03/22 82.5 kg (181 lb 14.4 oz)   11/23/21 79.8 kg (176 lb)   11/03/21 78.8 kg (173 lb 12.8 oz)                  Patient Active Problem List   Diagnosis     CARDIOVASCULAR SCREENING; LDL GOAL LESS THAN 160     Benign essential hypertension     Anxiety     Major depressive disorder with single episode     Factor V Leiden carrier (H)     Class 1 obesity due to excess calories without serious comorbidity with body mass  index (BMI) of 31.0 to 31.9 in adult     Past Surgical History:   Procedure Laterality Date      SECTION      5     LAP VENTRAL HERNIA REPAIR  2018     TUBAL LIGATION  2017       Social History     Tobacco Use     Smoking status: Never Smoker     Smokeless tobacco: Never Used   Substance Use Topics     Alcohol use: Yes     Family History   Problem Relation Age of Onset     Chronic Kidney Disease Mother         ESRD     Hypertension Mother      Anxiety Disorder Mother      Depression Mother      Hypertension Father      Hypothyroidism Sister      Hypertension Sister      Anxiety Disorder Sister      Depression Sister      Diabetes Maternal Grandmother      Hypertension Maternal Grandmother      Fibroids Sister      Hypertension Sister          Current Outpatient Medications   Medication Sig Dispense Refill     amLODIPine-valsartan (EXFORGE)  MG tablet Take 1 tablet by mouth daily 90 tablet 0     benzonatate (TESSALON) 200 MG capsule Take 1 capsule (200 mg) by mouth 3 times daily as needed for cough 21 capsule 3     D3-50 1.25 MG (02970 UT) capsule TAKE 1 CAPSULE (50,000 UNITS) BY MOUTH EVERY 7 DAYS FOR 8 DOSES 4 capsule 1     escitalopram (LEXAPRO) 10 MG tablet Take 1 tablet (10 mg) by mouth daily 90 tablet 1     hydrALAZINE (APRESOLINE) 25 MG tablet Take 1 tablet (25 mg) by mouth 2 times daily For blood pressure 180 tablet 1     hydrochlorothiazide (HYDRODIURIL) 50 MG tablet Take 0.5 tablets (25 mg) by mouth daily 90 tablet 0     hydrOXYzine (ATARAX) 25 MG tablet Take 1-4 tablets ( mg) by mouth every 8 hours as needed for anxiety or other (insomnia) 90 tablet 1     metoprolol succinate ER (TOPROL-XL) 100 MG 24 hr tablet TAKE 1 TABLET BY MOUTH EVERY DAY 90 tablet 0     ondansetron (ZOFRAN-ODT) 4 MG ODT tab Take 1 tablet (4 mg) by mouth every 8 hours as needed for nausea 12 tablet 0     No Known Allergies    Review of Systems   Constitutional, HEENT, cardiovascular, pulmonary, GI, ,  "musculoskeletal, neuro, skin, endocrine and psych systems are negative, except as otherwise noted.      Objective    BP (!) 144/86 (BP Location: Right arm, Patient Position: Sitting, Cuff Size: Adult Regular)   Pulse 72   Temp 98.9  F (37.2  C) (Oral)   Resp 18   Ht 1.6 m (5' 3\")   Wt 82.5 kg (181 lb 14.4 oz)   LMP 05/09/2022   SpO2 97%   BMI 32.22 kg/m    Body mass index is 32.22 kg/m .  Physical Exam   GENERAL: Patient is well nourished, well developed,in no apparent distress, non-toxic, in no respiratory distress and acyanotic, alert, cooperative and well hydrated  EYES: Eyes grossly normal to inspection and conjunctivae and sclerae normal  HENT:oral mucous membranes moist   NECK:normal and supple  CARDIAC:regular rates and rhythm and no irregular beats  without LE edema bilaterally  RESP: normal respiratory rate and rhythm, lungs clear to auscultation  unlabored respirations, no intercostal retractions or accessory muscle use  ABD:soft, nontender  SKIN: Skin color, texture, turgor normal. No rashes or lesions.  MS: extremities normal- no gross deformities noted, gait normal and normal muscle tone  NEURO: mentation intact and speech normal  PSYCH: Alert, oriented, thought content appropriate,mentation appears normal., affect and mood normal    Results for orders placed or performed in visit on 06/03/22   XR Foot Right G/E 3 Views     Status: None    Narrative    XR FOOT RIGHT G/E 3 VIEWS 6/3/2022 9:25 AM     HISTORY: Right foot pain    COMPARISON: None.      Impression    IMPRESSION: No fractures are evident. Normal joint spacing and  alignment. The soft tissues are unremarkable.     DEANGELO VOSS MD         SYSTEM ID:  [id number]   Results for orders placed or performed in visit on 06/03/22   CBC with platelets     Status: Abnormal   Result Value Ref Range    WBC Count 7.5 4.0 - 11.0 10e3/uL    RBC Count 5.13 3.80 - 5.20 10e6/uL    Hemoglobin 12.9 11.7 - 15.7 g/dL    Hematocrit 39.5 35.0 - 47.0 %    " MCV 77 (L) 78 - 100 fL    MCH 25.1 (L) 26.5 - 33.0 pg    MCHC 32.7 31.5 - 36.5 g/dL    RDW 13.2 10.0 - 15.0 %    Platelet Count 336 150 - 450 10e3/uL   Basic metabolic panel     Status: Abnormal   Result Value Ref Range    Sodium 137 133 - 144 mmol/L    Potassium 3.7 3.4 - 5.3 mmol/L    Chloride 104 94 - 109 mmol/L    Carbon Dioxide (CO2) 27 20 - 32 mmol/L    Anion Gap 6 3 - 14 mmol/L    Urea Nitrogen 17 7 - 30 mg/dL    Creatinine 0.83 0.52 - 1.04 mg/dL    Calcium 9.6 8.5 - 10.1 mg/dL    Glucose 105 (H) 70 - 99 mg/dL    GFR Estimate 90 >60 mL/min/1.73m2   CRP inflammation     Status: Normal   Result Value Ref Range    CRP Inflammation <2.9 0.0 - 8.0 mg/L   Erythrocyte sedimentation rate auto     Status: Normal   Result Value Ref Range    Erythrocyte Sedimentation Rate 11 0 - 20 mm/hr   Uric acid     Status: Abnormal   Result Value Ref Range    Uric Acid 6.4 (H) 2.6 - 6.0 mg/dL       Assessment & Plan     Right foot pain  Concern for gout and rule out stress fracture   - XR Foot Right G/E 3 Views  - naproxen (NAPROSYN) 500 MG tablet  Dispense: 30 tablet; Refill: 1  - CBC with platelets  - Basic metabolic panel  - CRP inflammation  - Erythrocyte sedimentation rate auto  - Uric acid  - Orthopedic  Referral    Current moderate episode of major depressive disorder without prior episode (H)  Change dose of medication to Lexapro 20 mg q day  Reviewed concept of depression as function of biochemical imbalance of neurotransmitters/rationale for treatment.  Risks and benefits of medication(s) reviewed with patient.  Questions answered.  Followup appointment in 1 month(s)  Patient instructed to call for significant side effects medications or problems  Patient advised immediate presentation to hospital for suicidal thought, etc.  - escitalopram (LEXAPRO) 20 MG tablet  Dispense: 90 tablet; Refill: 0      Ordering of each unique test  Prescription drug management   Time spent doing chart review, history and exam,  "documentation and further activities per the note       BMI:   Estimated body mass index is 32.22 kg/m  as calculated from the following:    Height as of this encounter: 1.6 m (5' 3\").    Weight as of this encounter: 82.5 kg (181 lb 14.4 oz).       See Patient Instructions  Patient Instructions     PLAN:   1.   Symptomatic therapy suggested: Increase lexapro to 20 mg a day   Will start on naprosyn twice a day for foot pain.  apply ice packs, prescription for NSAID given    2.  Orders Placed This Encounter   Medications     escitalopram (LEXAPRO) 20 MG tablet     Sig: Take 1 tablet (20 mg) by mouth daily     Dispense:  90 tablet     Refill:  0     Orders Placed This Encounter   Procedures     REVIEW OF HEALTH MAINTENANCE PROTOCOL ORDERS     CBC with platelets     Basic metabolic panel     CRP inflammation     Erythrocyte sedimentation rate auto     Uric acid       3. Patient needs to follow up in if no improvement,or sooner if worsening of symptoms or other symptoms develop.  Will follow up and/or notify patient of  results via My Chart to determine further need for followup  Follow up in 1 month with Dr May           Return in about 1 month (around 7/3/2022), or if symptoms worsen or fail to improve.    RICO Anthony CNP  [facility]          Patient answers are not available for this visit.  "

## 2022-06-03 NOTE — RESULT ENCOUNTER NOTE
Jimbo Rosales,    Attached are your test results.  Xray is normal   Am going to refer you to podiatry   Please call [phone] to make appointment  if you do not hear from referrals in the next few days.   Waiting on rest of labs as well    Please contact us if you have any questions.    Madiha Mcdowell, CNP

## 2022-06-06 NOTE — RESULT ENCOUNTER NOTE
Jimbo Rosales,    Attached are your test results.  -Kidney function (GFR) is normal.  -Sodium is normal.  -Potassium is normal.  -Calcium is normal.  -Glucose is mildly elevated but you were nonfasting and this is okay..  Uric acid is elevated  and looking at your symptoms suspicious of gout   Inflammatory markers are normal    Please contact us if you have any questions.    Madiha Mcdowell, CNP

## 2022-06-15 NOTE — TELEPHONE ENCOUNTER
DIAGNOSIS: Right foot pain    APPOINTMENT DATE: 06/22/2022   NOTES STATUS DETAILS   OFFICE NOTE from referring provider Internal 06/03/2022 Madiha Mcdowell [facility]    OFFICE NOTE from other specialist N/A    DISCHARGE SUMMARY from hospital N/A    DISCHARGE REPORT from the ER N/A    OPERATIVE REPORT N/A    EMG report N/A    MEDICATION LIST N/A    MRI N/A    DEXA (osteoporosis/bone health) N/A    CT SCAN N/A    XRAYS (IMAGES & REPORTS) Internal 06/03/2022 [facility]

## 2022-06-22 ENCOUNTER — PRE VISIT (OUTPATIENT)
Dept: PODIATRY | Facility: CLINIC | Age: 42
End: 2022-06-22
Payer: COMMERCIAL

## 2022-07-03 ENCOUNTER — HEALTH MAINTENANCE LETTER (OUTPATIENT)
Age: 42
End: 2022-07-03

## 2022-08-29 DIAGNOSIS — F32.1 CURRENT MODERATE EPISODE OF MAJOR DEPRESSIVE DISORDER WITHOUT PRIOR EPISODE (H): Chronic | ICD-10-CM

## 2022-08-31 RX ORDER — ESCITALOPRAM OXALATE 20 MG/1
TABLET ORAL
Qty: 90 TABLET | Refills: 0 | Status: SHIPPED | OUTPATIENT
Start: 2022-08-31 | End: 2022-10-11

## 2022-08-31 NOTE — TELEPHONE ENCOUNTER
Refill completed.   Please update PHQ 9 and KAILA  Thanks      PHQ-9 SCORE 10/19/2021 11/23/2021 6/3/2022   PHQ-9 Total Score MyChart - 18 (Moderately severe depression) 14 (Moderate depression)   PHQ-9 Total Score 7 18 14       KAILA-7 SCORE 5/3/2021 9/14/2021 10/19/2021   Total Score 21 19 11       [facility] Follow-up to PHQ 10/19/2021 11/23/2021 6/3/2022   PHQ-9 9. Suicide Ideation past 2 weeks Not at all Not at all Not at all

## 2022-09-15 DIAGNOSIS — I10 UNCONTROLLED HYPERTENSION: ICD-10-CM

## 2022-09-15 RX ORDER — HYDRALAZINE HYDROCHLORIDE 25 MG/1
25 TABLET, FILM COATED ORAL 2 TIMES DAILY
Qty: 180 TABLET | Refills: 0 | Status: SHIPPED | OUTPATIENT
Start: 2022-09-15 | End: 2022-12-12

## 2022-09-15 NOTE — LETTER
Kandy Rosales  [address]   [place] [address]          09/15/22      Dear Kandy Rosales        At [facility] we care about your health and are committed to providing quality patient care. Regular appointments are a vital part of the care and management of your health and can help prevent many of the complications that can occur.      We have refilled your medication(s)  We will need you to schedule a follow up appointment before any additional refills can be given. Please call [phone] to schedule this appointment.      Thank you,    BENNIE [facility]

## 2022-09-15 NOTE — TELEPHONE ENCOUNTER
Called and left a voicemail message and sent a letter.  Conchita Pedraza MA  [facility]  2nd Floor  Primary Care

## 2022-10-04 ENCOUNTER — MYC MEDICAL ADVICE (OUTPATIENT)
Dept: FAMILY MEDICINE | Facility: CLINIC | Age: 42
End: 2022-10-04

## 2022-10-06 ASSESSMENT — PATIENT HEALTH QUESTIONNAIRE - PHQ9
10. IF YOU CHECKED OFF ANY PROBLEMS, HOW DIFFICULT HAVE THESE PROBLEMS MADE IT FOR YOU TO DO YOUR WORK, TAKE CARE OF THINGS AT HOME, OR GET ALONG WITH OTHER PEOPLE: EXTREMELY DIFFICULT
SUM OF ALL RESPONSES TO PHQ QUESTIONS 1-9: 23
SUM OF ALL RESPONSES TO PHQ QUESTIONS 1-9: 23

## 2022-10-11 ENCOUNTER — TELEPHONE (OUTPATIENT)
Dept: BEHAVIORAL HEALTH | Facility: CLINIC | Age: 42
End: 2022-10-11

## 2022-10-11 ENCOUNTER — VIRTUAL VISIT (OUTPATIENT)
Dept: FAMILY MEDICINE | Facility: CLINIC | Age: 42
End: 2022-10-11
Payer: COMMERCIAL

## 2022-10-11 ENCOUNTER — MYC MEDICAL ADVICE (OUTPATIENT)
Dept: BEHAVIORAL HEALTH | Facility: CLINIC | Age: 42
End: 2022-10-11

## 2022-10-11 DIAGNOSIS — F32.1 CURRENT MODERATE EPISODE OF MAJOR DEPRESSIVE DISORDER WITHOUT PRIOR EPISODE (H): Primary | Chronic | ICD-10-CM

## 2022-10-11 DIAGNOSIS — F41.9 ANXIETY: ICD-10-CM

## 2022-10-11 DIAGNOSIS — E55.9 VITAMIN D DEFICIENCY: ICD-10-CM

## 2022-10-11 PROCEDURE — 99214 OFFICE O/P EST MOD 30 MIN: CPT | Mod: GT | Performed by: FAMILY MEDICINE

## 2022-10-11 PROCEDURE — 96127 BRIEF EMOTIONAL/BEHAV ASSMT: CPT | Mod: GT | Performed by: FAMILY MEDICINE

## 2022-10-11 RX ORDER — ESCITALOPRAM OXALATE 20 MG/1
20 TABLET ORAL DAILY
Qty: 90 TABLET | Refills: 0 | Status: SHIPPED | OUTPATIENT
Start: 2022-10-11 | End: 2023-05-15

## 2022-10-11 RX ORDER — BUPROPION HYDROCHLORIDE 150 MG/1
150 TABLET ORAL EVERY MORNING
Qty: 30 TABLET | Refills: 1 | Status: SHIPPED | OUTPATIENT
Start: 2022-10-11 | End: 2022-11-14

## 2022-10-11 ASSESSMENT — PATIENT HEALTH QUESTIONNAIRE - PHQ9
SUM OF ALL RESPONSES TO PHQ QUESTIONS 1-9: 25
SUM OF ALL RESPONSES TO PHQ QUESTIONS 1-9: 25
10. IF YOU CHECKED OFF ANY PROBLEMS, HOW DIFFICULT HAVE THESE PROBLEMS MADE IT FOR YOU TO DO YOUR WORK, TAKE CARE OF THINGS AT HOME, OR GET ALONG WITH OTHER PEOPLE: EXTREMELY DIFFICULT

## 2022-10-11 NOTE — PROGRESS NOTES
Kandy is a 42 year old who is being evaluated via a billable video visit.      How would you like to obtain your AVS? MyChart  If the video visit is dropped, the invitation should be resent by: Text to cell phone: [phone]  Will anyone else be joining your video visit? No        Assessment & Plan     Current moderate episode of major depressive disorder without prior episode (H)  PHQ 10/6/2022 10/6/2022 10/11/2022   PHQ-9 Total Score 20 23 25   Q9: Thoughts of better off dead/self-harm past 2 weeks Several days Several days More than half the days   F/U: Thoughts of suicide or self-harm Yes Yes Yes   F/U: Self harm-plan Yes Yes No   F/U: Self-harm action No No No   F/U: Safety concerns No No No   Some encounter information is confidential and restricted. Go to Review Flowsheets activity to see all data.     KAILA-7 SCORE 9/14/2021 10/19/2021 10/6/2022   Total Score - - 18 (severe anxiety)   Total Score 19 11 18       Reviewed and expressed my concerns over her PHQ-9 scores  Patient has passive suicidal thoughts, but denies active suicidal ideations or previous attempts  Messages sent to [facility] , Debora Becerra to reach out to patient to review safety concerns  Recommended to continue with current dose of Lexapro in the evening, will start on Wellbutrin 150 mg daily in the morning  Recommended to add vitamin D 5000 units once a day due to underlying vitamin D deficiency  Recommended to start brisk walking exercises for at least 30 minutes every day  Reviewed healthy eating, sleep hygiene  Recommended to follow-up with PCP in 3 to 4 weeks for recheck or sooner if needed  Dosing and potential medication side effects discussed.  Patient verbalised understanding and is agreeable to the plan.    - escitalopram (LEXAPRO) 20 MG tablet; Take 1 tablet (20 mg) by mouth daily  - buPROPion (WELLBUTRIN XL) 150 MG 24 hr tablet; Take 1 tablet (150 mg) by mouth every morning  - EMOTIONAL / BEHAVIORAL ASSESSMENT      Anxiety  as  "above  - escitalopram (LEXAPRO) 20 MG tablet; Take 1 tablet (20 mg) by mouth daily      Vitamin D deficiency  Reviewed low vitamin D of 14 from November 2021  Patient is not taking any vitamin D supplements consistently  Recommended to start taking over-the-counter vitamin D 5000 units once daily      Review of the result(s) of each unique test - Vitamin D from 11/2021         BMI:   Estimated body mass index is 32.22 kg/m  as calculated from the following:    Height as of 6/3/22: 1.6 m (5' 3\").    Weight as of 6/3/22: 82.5 kg (181 lb 14.4 oz).       Depression Screening Follow Up    PHQ 10/11/2022   PHQ-9 Total Score 25   Q9: Thoughts of better off dead/self-harm past 2 weeks More than half the days   F/U: Thoughts of suicide or self-harm Yes   F/U: Self harm-plan No   F/U: Self-harm action No   F/U: Safety concerns No   Some encounter information is confidential and restricted. Go to Review "[employer] activity to see all data.     Last PHQ-9 10/11/2022   1.  Little interest or pleasure in doing things 3   2.  Feeling down, depressed, or hopeless 3   3.  Trouble falling or staying asleep, or sleeping too much 3   4.  Feeling tired or having little energy 3   5.  Poor appetite or overeating 3   6.  Feeling bad about yourself 3   7.  Trouble concentrating 3   8.  Moving slowly or restless 2   Q9: Thoughts of better off dead/self-harm past 2 weeks 2   PHQ-9 Total Score 25   Difficulty at work, home, or with people -   In the past two weeks have you had thoughts of suicide or self harm? Yes   Do you have concerns about your personal safety or the safety of others? No   In the past 2 weeks have you thought about a plan or had intention to harm yourself? No   In the past 2 weeks have you acted on these thoughts in any way? No   Some encounter information is confidential and restricted. Go to Review "[employer] activity to see all data.         No flowsheet data found.      Follow Up      Follow Up Actions Taken  Proceed " with counseling as planned and scheduled on 10,17, follow-up with PCP in 3 to 4 weeks for recheck or sooner if needed, follow-up with [facility] today    Discussed the following ways the patient can remain in a safe environment: Chart documentation done in part with Dragon Voice recognition Software. Although reviewed after completion, some word and grammatical error may remain.    See Patient Instructions    Return in about 1 month (around 11/11/2022), or if symptoms worsen or fail to improve, for Depression recheck with your primary care physician.    Douglas Goyal MD  [facility] [place]    Subjective   Arthena is a 42 year old, presenting for the following health issues:  No chief complaint on file.  Patient is here for a video visit instead of in person visit due to the current COVID-19 pandemic.  Patient is new to the provider, is here for video visit for worsening depression and anxiety secondary to situational stressors  PMH is significant for HTN.    History of Present Illness       Mental Health Follow-up:  Patient presents to follow-up on Depression & Anxiety.Patient's depression since last visit has been:  Bad  The patient is having other symptoms associated with depression.  Patient's anxiety since last visit has been:  Bad  The patient is having other symptoms associated with anxiety.  Any significant life events: relationship concerns, job concerns, financial concerns, housing concerns and health concerns  Patient is feeling anxious or having panic attacks.  Patient has no concerns about alcohol or drug use.    She eats 0-1 servings of fruits and vegetables daily.She consumes 2 sweetened beverage(s) daily.She exercises with enough effort to increase her heart rate 9 or less minutes per day.  She exercises with enough effort to increase her heart rate 3 or less days per week.   She is taking medications regularly.    Today's PHQ-9         PHQ-9 Total Score: 25    PHQ-9 Q9 Thoughts of  better off dead/self-harm past 2 weeks :   More than half the days  Thoughts of suicide or self harm: (P) Yes  Self-harm Plan:   (P) No  Self-harm Action:     (P) No  Safety concerns for self or others: (P) No    How difficult have these problems made it for you to do your work, take care of things at home, or get along with other people: Extremely difficult       Depression and Anxiety Follow-Up  Patient has been taking Lexapro for the past 1 year plus for depression and anxiety, that has been worsening in the past few months due to increasing situational stressors at home  Patient had her Lexapro dose increased from 10 to 20 mg daily 4 months ago that has not made a significant improvement in her symptoms  Denies use of tobacco, alcohol, recreational drugs  She has not seen a counselor before but has an appointment with the counseling scheduled on 17 October    How are you doing with your depression since your last visit? Worsened     How are you doing with your anxiety since your last visit?  Worsened     Are you having other symptoms that might be associated with depression or anxiety? No    Have you had a significant life event? Relationship Concerns     Do you have any concerns with your use of alcohol or other drugs? No    Social History     Tobacco Use     Smoking status: Never     Smokeless tobacco: Never   Vaping Use     Vaping Use: Never used   Substance Use Topics     Alcohol use: Yes     Drug use: Never     PHQ 10/6/2022 10/6/2022 10/11/2022   PHQ-9 Total Score 20 23 25   Q9: Thoughts of better off dead/self-harm past 2 weeks Several days Several days More than half the days   F/U: Thoughts of suicide or self-harm Yes Yes Yes   F/U: Self harm-plan Yes Yes No   F/U: Self-harm action No No No   F/U: Safety concerns No No No   Some encounter information is confidential and restricted. Go to Review Flowsheets activity to see all data.     KAILA-7 SCORE 9/14/2021 10/19/2021 10/6/2022   Total Score - - 18  (severe anxiety)   Total Score 19 11 18     Last PHQ-9 10/11/2022   1.  Little interest or pleasure in doing things 3   2.  Feeling down, depressed, or hopeless 3   3.  Trouble falling or staying asleep, or sleeping too much 3   4.  Feeling tired or having little energy 3   5.  Poor appetite or overeating 3   6.  Feeling bad about yourself 3   7.  Trouble concentrating 3   8.  Moving slowly or restless 2   Q9: Thoughts of better off dead/self-harm past 2 weeks 2   PHQ-9 Total Score 25   Difficulty at work, home, or with people -   In the past two weeks have you had thoughts of suicide or self harm? Yes   Do you have concerns about your personal safety or the safety of others? No   In the past 2 weeks have you thought about a plan or had intention to harm yourself? No   In the past 2 weeks have you acted on these thoughts in any way? No   Some encounter information is confidential and restricted. Go to Review Flowsheets activity to see all data.     KAILA-7  10/6/2022   1. Feeling nervous, anxious, or on edge 3   2. Not being able to stop or control worrying 3   3. Worrying too much about different things 3   4. Trouble relaxing 3   5. Being so restless that it is hard to sit still 3   6. Becoming easily annoyed or irritable 1   7. Feeling afraid, as if something awful might happen 2   KAILA-7 Total Score 18   If you checked any problems, how difficult have they made it for you to do your work, take care of things at home, or get along with other people? Extremely difficult           Review of Systems   CONSTITUTIONAL: NEGATIVE for fever, chills, change in weight  RESP: NEGATIVE for significant cough or SOB  CV: NEGATIVE for chest pain, palpitations or peripheral edema  CV: Hx HTN  PSYCHIATRIC: HX anxiety and HX depression      Objective           Vitals:  No vitals were obtained today due to virtual visit.    Physical Exam   GENERAL: Healthy, alert and no distress  RESP: No audible wheeze, cough, or visible cyanosis.   No visible retractions or increased work of breathing.    NEURO: Cranial nerves grossly intact.  Mentation and speech appropriate for age.  PSYCH: mentation appears normal, tearful and fatigued                Video-Visit Details    Video Start Time: 11;26am    Type of service:  Video Visit    Video End Time:11;38am    Originating Location (pt. Location): Home    Distant Location (provider location):  [facility]     Platform used for Video Visit: [employer]

## 2022-10-11 NOTE — TELEPHONE ENCOUNTER
Patient takes Lexapro 20mg, feels med is helping  Patient is going through a divorce, increased life stressors right now  Has had thoughts of hurting herself, has not acted on those thoughts, no plan in place to harm herself  Advised patient needs office visit to discuss medication  Video visit scheduled with Dr. Goyal today at 11:10      Rona LOREDO, RN

## 2022-10-11 NOTE — TELEPHONE ENCOUNTER
"Reached out to pt per the request of  for safety concerns. Patient states that she does experience suicidal thoughts of not wanting to be alive, admits to thoughts of overdosing. She has been able to manage them with isolating herself in a dark room until it passes but was open to creating a safety plan and finding other coping skills. Plan will be sent via mail and Sojean[employer]. Pt states she is starting therapy on 10/17/22 therefore does not need to meet with [facility]. She expresses gratitude for the call and creating plan together. Contracts for safety.         [facility]    Patient's Name: Kandy Rosales  October 11, 2022    SAFETY PLAN:  Step 1: Warning signs / cues (Thoughts, images, mood, situation, behavior) that a crisis may be developing:    Thoughts: \"I can't do this anymore\" and I'm at the end of my rope\"    Images: flashbacks    Thinking Processes: highly critical and negative thoughts:   Why isn't it getting better? Whats going on?    Mood: worsening depression, hopelessness, intense anger, intense worry and agitation        Behaviors: isolating/withdrawing  and not talking as much    Situations: trauma  and Increased stress- with kids or at work      Step 2: Coping strategies - Things I can do to take my mind off of my problems without contacting another person (relaxation technique, physical activity):    Distress Tolerance Strategies:  Sitting in a dark spot, Pillow (larson fur), showering, listening to instrumental          Physical Activities: go for a walk, deep breathing and stretching      Focus on helpful thoughts:  Push through\"     Step 3: People and social settings that provide distraction:   Name:  Sister  Phone: In cell phone    Name: Mother in law  Phone: In cell phone       park and library    Step 4: Remind myself of people and things that are important to me and worth living for:           My 5 children         Step 5: When I am in crisis, I can ask these " people to help me use my safety plan:   Name: Mother in law  Phone: In cell phone       Step 6: Making the environment safe:     secure medications: mother in law to store medications and freeze them    Step 7: Professionals or agencies I can contact during a crisis:    Suicide Prevention Lifeline: 2-754-442-TALK (1461)    Crisis Text Line Service: Text   [place]  to 166939.    Local Crisis Services: [phone]    Call 911 or go to my nearest emergency department.   I helped develop this safety plan and agree to use it when needed.  I have been given a copy of this plan.      Patient signature: _______________________________________________________________  Today s date:  October 11, 2022    Adapted from Safety Plan Template 2008 Hafsa Crisostomo and Malick Olivia is reprinted with the express permission of the authors.  No portion of the Safety Plan Template may be reproduced without the express, written permission.  You can contact the authors at [email or web address] or [email or web address].

## 2022-10-11 NOTE — TELEPHONE ENCOUNTER
This writer attempted to contact patient on 10/11/22      Reason for call PHQ9 and left message.      If patient calls back:    Refer call to Merna Gutierres RN Team        Triage pt regarding PHQ9 responses. Schedule visit if needed.           Shanna Lewis RN  [facility]

## 2022-10-17 ENCOUNTER — MYC MEDICAL ADVICE (OUTPATIENT)
Dept: FAMILY MEDICINE | Facility: CLINIC | Age: 42
End: 2022-10-17

## 2022-10-17 ENCOUNTER — VIRTUAL VISIT (OUTPATIENT)
Dept: FAMILY MEDICINE | Facility: CLINIC | Age: 42
End: 2022-10-17
Payer: COMMERCIAL

## 2022-10-17 DIAGNOSIS — F32.1 CURRENT MODERATE EPISODE OF MAJOR DEPRESSIVE DISORDER WITHOUT PRIOR EPISODE (H): Primary | ICD-10-CM

## 2022-10-17 PROCEDURE — 99213 OFFICE O/P EST LOW 20 MIN: CPT | Mod: GT | Performed by: FAMILY MEDICINE

## 2022-10-17 NOTE — PROGRESS NOTES
"Kandy is a 42 year old who is being evaluated via a billable video visit.      How would you like to obtain your AVS? MyChart  If the video visit is dropped, the invitation should be resent by: Send to e-mail at: [email or web address]  Will anyone else be joining your video visit? No        Assessment & Plan     Current moderate episode of major depressive disorder without prior episode (H)  Agree with LA time off for medication adjustment and therapy initiation. Advised to contact student affairs about this so she can get her schooling back on track.  - Adult Mental Health  Referral; Future     BMI:   Estimated body mass index is 32.22 kg/m  as calculated from the following:    Height as of 6/3/22: 1.6 m (5' 3\").    Weight as of 6/3/22: 82.5 kg (181 lb 14.4 oz).   Weight management plan: Discussed healthy diet and exercise guidelines        Return in about 4 weeks (around 11/14/2022), or if symptoms worsen or fail to improve.    Ally Covington MD  [facility]    Subjective   Kandy is a 42 year old, presenting for the following health issues:   Follow Up      HPI     Depression and Anxiety Follow-Up    How are you doing with your depression since your last visit? Worsened     How are you doing with your anxiety since your last visit?  Worsened    Are you having other symptoms that might be associated with depression or anxiety? Yes:  headaches insomnia loss off appetite    Have you had a significant life event? Relationship Concerns, Job Concerns and OTHER: learning a new skill      Do you have any concerns with your use of alcohol or other drugs? No    Social History     Tobacco Use     Smoking status: Never     Smokeless tobacco: Never   Vaping Use     Vaping Use: Never used   Substance Use Topics     Alcohol use: Yes     Drug use: Never     PHQ 10/6/2022 10/11/2022 10/11/2022   PHQ-9 Total Score 23 25 25   Q9: Thoughts of better off dead/self-harm past 2 " weeks Several days More than half the days More than half the days   F/U: Thoughts of suicide or self-harm Yes Yes Yes   F/U: Self harm-plan Yes No No   F/U: Self-harm action No No No   F/U: Safety concerns No No No     KAILA-7 SCORE 9/14/2021 10/19/2021 10/6/2022   Total Score - - 18 (severe anxiety)   Total Score 19 11 18         Suicide Assessment Five-step Evaluation and Treatment (SAFE-T)    Feeling overwhelmed due to relationship changes, learning new skills at work, going to school fulltime and dealing with kids.  Not sleeping well, having suicidal thoughts. Started early October and triggered by separation from partner and worry about what will come next. Talked with Debora Rodruben and safety plan initiated. She was started on Wellbutrin and stated it 2 days ago.  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    Review of Systems         Objective           Vitals:  No vitals were obtained today due to virtual visit.    Physical Exam   GENERAL: Healthy, alert and no distress  EYES: Eyes grossly normal to inspection.  No discharge or erythema, or obvious scleral/conjunctival abnormalities.  RESP: No audible wheeze, cough, or visible cyanosis.  No visible retractions or increased work of breathing.    SKIN: Visible skin clear. No significant rash, abnormal pigmentation or lesions.  NEURO: Cranial nerves grossly intact.  Mentation and speech appropriate for age.  PSYCH: mentation appears normal and tearful            Video-Visit Details    Video Start Time: 4:55 PM    Type of service:  Video Visit    Video End Time:5:08 PM    Originating Location (pt. Location): Home    Distant Location (provider location):  On-site    Platform used for Video Visit: [employer]

## 2022-10-19 NOTE — TELEPHONE ENCOUNTER
Form faxed to the [employer] [phone], copy placed in abstract and original in tc bin. Copy mailed to pt's home and called her so she is aware this form has been completed and faxed.

## 2022-10-25 ENCOUNTER — TELEPHONE (OUTPATIENT)
Dept: FAMILY MEDICINE | Facility: CLINIC | Age: 42
End: 2022-10-25

## 2022-10-25 DIAGNOSIS — F32.1 CURRENT MODERATE EPISODE OF MAJOR DEPRESSIVE DISORDER WITHOUT PRIOR EPISODE (H): Primary | ICD-10-CM

## 2022-10-25 ASSESSMENT — ANXIETY QUESTIONNAIRES
GAD7 TOTAL SCORE: 21
GAD7 TOTAL SCORE: 21
1. FEELING NERVOUS, ANXIOUS, OR ON EDGE: NEARLY EVERY DAY
7. FEELING AFRAID AS IF SOMETHING AWFUL MIGHT HAPPEN: NEARLY EVERY DAY
GAD7 TOTAL SCORE: 21
5. BEING SO RESTLESS THAT IT IS HARD TO SIT STILL: NEARLY EVERY DAY
2. NOT BEING ABLE TO STOP OR CONTROL WORRYING: NEARLY EVERY DAY
7. FEELING AFRAID AS IF SOMETHING AWFUL MIGHT HAPPEN: NEARLY EVERY DAY
GAD7 TOTAL SCORE: 21
5. BEING SO RESTLESS THAT IT IS HARD TO SIT STILL: NEARLY EVERY DAY
6. BECOMING EASILY ANNOYED OR IRRITABLE: NEARLY EVERY DAY
7. FEELING AFRAID AS IF SOMETHING AWFUL MIGHT HAPPEN: NEARLY EVERY DAY
IF YOU CHECKED OFF ANY PROBLEMS ON THIS QUESTIONNAIRE, HOW DIFFICULT HAVE THESE PROBLEMS MADE IT FOR YOU TO DO YOUR WORK, TAKE CARE OF THINGS AT HOME, OR GET ALONG WITH OTHER PEOPLE: EXTREMELY DIFFICULT
6. BECOMING EASILY ANNOYED OR IRRITABLE: NEARLY EVERY DAY
2. NOT BEING ABLE TO STOP OR CONTROL WORRYING: NEARLY EVERY DAY
8. IF YOU CHECKED OFF ANY PROBLEMS, HOW DIFFICULT HAVE THESE MADE IT FOR YOU TO DO YOUR WORK, TAKE CARE OF THINGS AT HOME, OR GET ALONG WITH OTHER PEOPLE?: EXTREMELY DIFFICULT
GAD7 TOTAL SCORE: 21
GAD7 TOTAL SCORE: 21
1. FEELING NERVOUS, ANXIOUS, OR ON EDGE: NEARLY EVERY DAY
4. TROUBLE RELAXING: NEARLY EVERY DAY
4. TROUBLE RELAXING: NEARLY EVERY DAY
8. IF YOU CHECKED OFF ANY PROBLEMS, HOW DIFFICULT HAVE THESE MADE IT FOR YOU TO DO YOUR WORK, TAKE CARE OF THINGS AT HOME, OR GET ALONG WITH OTHER PEOPLE?: EXTREMELY DIFFICULT
3. WORRYING TOO MUCH ABOUT DIFFERENT THINGS: NEARLY EVERY DAY
3. WORRYING TOO MUCH ABOUT DIFFERENT THINGS: NEARLY EVERY DAY
7. FEELING AFRAID AS IF SOMETHING AWFUL MIGHT HAPPEN: NEARLY EVERY DAY
IF YOU CHECKED OFF ANY PROBLEMS ON THIS QUESTIONNAIRE, HOW DIFFICULT HAVE THESE PROBLEMS MADE IT FOR YOU TO DO YOUR WORK, TAKE CARE OF THINGS AT HOME, OR GET ALONG WITH OTHER PEOPLE: EXTREMELY DIFFICULT

## 2022-10-26 ENCOUNTER — VIRTUAL VISIT (OUTPATIENT)
Dept: PSYCHOLOGY | Facility: CLINIC | Age: 42
End: 2022-10-26
Attending: FAMILY MEDICINE
Payer: COMMERCIAL

## 2022-10-26 DIAGNOSIS — F33.1 MAJOR DEPRESSIVE DISORDER, RECURRENT EPISODE, MODERATE (H): ICD-10-CM

## 2022-10-26 DIAGNOSIS — F43.10 POSTTRAUMATIC STRESS DISORDER WITH DISSOCIATIVE SYMPTOMS: ICD-10-CM

## 2022-10-26 DIAGNOSIS — F41.1 GAD (GENERALIZED ANXIETY DISORDER): Primary | ICD-10-CM

## 2022-10-26 PROCEDURE — 90791 PSYCH DIAGNOSTIC EVALUATION: CPT | Mod: GT | Performed by: COUNSELOR

## 2022-10-26 ASSESSMENT — COLUMBIA-SUICIDE SEVERITY RATING SCALE - C-SSRS
3. HAVE YOU BEEN THINKING ABOUT HOW YOU MIGHT KILL YOURSELF?: YES
ATTEMPT LIFETIME: YES
2. HAVE YOU ACTUALLY HAD ANY THOUGHTS OF KILLING YOURSELF?: YES
ATTEMPT PAST THREE MONTHS: NO
TOTAL  NUMBER OF ACTUAL ATTEMPTS LIFETIME: 4
1. HAVE YOU WISHED YOU WERE DEAD OR WISHED YOU COULD GO TO SLEEP AND NOT WAKE UP?: YES
2. HAVE YOU ACTUALLY HAD ANY THOUGHTS OF KILLING YOURSELF?: YES
1. IN THE PAST MONTH, HAVE YOU WISHED YOU WERE DEAD OR WISHED YOU COULD GO TO SLEEP AND NOT WAKE UP?: YES

## 2022-10-26 NOTE — PROGRESS NOTES
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 adults and children. The and are open 24 hours a day, 7 days a week unless noted otherwise.     988 - National Suicide and Crisis Lifeline  If you or someone you know needs support now, call or text 658 or chat [email or web address]. 988 connects you with a trained crisis counselor who can help.     Crisis Text Line                       www.crisistextline.org  Text HOME to 220240 from anywhere in the [place], anytime, about any type of crisis. A live, trained crisis counselor will receive the text and respond quickly.     Deandre Lifeline for LGBTQ Youth  A national crisis intervention and suicide lifeline for LGBTQ youth under 25. Provides a safe place to talk without judgement.   Call 1-695.678.7188; text START to 564607 or visit www.thetrevorproject.org to talk to a trained counselor.    For [facility] crisis numbers, visit the [facility] website at:  [place]://[place].gov/dhs/people-we-serve/adults/health-care/mental-health/resources/crisis-contacts.jsp

## 2022-10-26 NOTE — TELEPHONE ENCOUNTER
Received the Mental Health Evaluation form from the [employer]. Authorization For Provider's Release of Medical Records To  form is not signed by the patient. Called and spoke to a representative from the [employer] requesting that a Signed Authorization be faxed to us. They state that they do not have a signed copy either. I explained that we will not be able to send any Visit notes. They understand.  Placed Mental Health Evaluation form in Dr Boom Covington's box.  Conchita Pedraza [facility]  2nd Floor  Primary Care

## 2022-11-01 ENCOUNTER — VIRTUAL VISIT (OUTPATIENT)
Dept: PSYCHOLOGY | Facility: CLINIC | Age: 42
End: 2022-11-01
Payer: COMMERCIAL

## 2022-11-01 DIAGNOSIS — F33.1 MAJOR DEPRESSIVE DISORDER, RECURRENT EPISODE, MODERATE (H): Primary | ICD-10-CM

## 2022-11-02 ENCOUNTER — TRANSFERRED RECORDS (OUTPATIENT)
Dept: HEALTH INFORMATION MANAGEMENT | Facility: CLINIC | Age: 42
End: 2022-11-02

## 2022-11-02 NOTE — TELEPHONE ENCOUNTER
Form faxed to the [employer] [phone], copy placed in abstract and original placed in tc bin.

## 2022-11-09 ENCOUNTER — VIRTUAL VISIT (OUTPATIENT)
Dept: PSYCHOLOGY | Facility: CLINIC | Age: 42
End: 2022-11-09
Payer: COMMERCIAL

## 2022-11-09 DIAGNOSIS — F41.1 GAD (GENERALIZED ANXIETY DISORDER): Primary | ICD-10-CM

## 2022-11-14 ENCOUNTER — VIRTUAL VISIT (OUTPATIENT)
Dept: FAMILY MEDICINE | Facility: CLINIC | Age: 42
End: 2022-11-14
Payer: COMMERCIAL

## 2022-11-14 ENCOUNTER — MYC MEDICAL ADVICE (OUTPATIENT)
Dept: FAMILY MEDICINE | Facility: CLINIC | Age: 42
End: 2022-11-14

## 2022-11-14 DIAGNOSIS — F32.1 CURRENT MODERATE EPISODE OF MAJOR DEPRESSIVE DISORDER WITHOUT PRIOR EPISODE (H): Chronic | ICD-10-CM

## 2022-11-14 PROCEDURE — 99214 OFFICE O/P EST MOD 30 MIN: CPT | Mod: GT | Performed by: FAMILY MEDICINE

## 2022-11-14 RX ORDER — BUPROPION HYDROCHLORIDE 300 MG/1
300 TABLET ORAL EVERY MORNING
Qty: 30 TABLET | Refills: 1 | Status: SHIPPED | OUTPATIENT
Start: 2022-11-14 | End: 2023-02-14

## 2022-11-14 NOTE — PROGRESS NOTES
Kandy is a 42 year old who is being evaluated via a billable video visit.      How would you like to obtain your AVS? MyChart  If the video visit is dropped, the invitation should be resent by:   Will anyone else be joining your video visit? No        Assessment & Plan     Current moderate episode of major depressive disorder without prior episode (H)  Improved but not controlled - increase dose.  Plan to continue dose that controls symptoms for 3 months  - buPROPion (WELLBUTRIN XL) 300 MG 24 hr tablet; Take 1 tablet (300 mg) by mouth every morning    The uses and side effects, including black box warnings as appropriate, were discussed in detail.  All patient questions were answered.  The patient was instructed to call immediately if any side effects developed.     Return in about 4 weeks (around 12/12/2022).    Ally Covington MD  [facility]    Carmen Gaitan is a 42 year old, presenting for the following health issues:  Recheck Medication      History of Present Illness       Reason for visit:  Follow up on mental issues    She eats 0-1 servings of fruits and vegetables daily.She consumes 1 sweetened beverage(s) daily.She exercises with enough effort to increase her heart rate 20 to 29 minutes per day.  She exercises with enough effort to increase her heart rate 3 or less days per week.   She is taking medications regularly.       Depression improved to 6 but still having issues.  When she is very busy she is more irritable as well. No side effects.  Doing well in therapy and having a good relationship.  Working on skills to cope with feelings.      Review of Systems   Constitutional, HEENT, cardiovascular, pulmonary, gi and gu systems are negative, except as otherwise noted.      Objective           Vitals:  No vitals were obtained today due to virtual visit.    Physical Exam   GENERAL: healthy, alert, no distress and obese  EYES: Eyes grossly normal to inspection.   No discharge or erythema, or obvious scleral/conjunctival abnormalities.  RESP: No audible wheeze, cough, or visible cyanosis.  No visible retractions or increased work of breathing.    SKIN: Visible skin clear. No significant rash, abnormal pigmentation or lesions.  NEURO: Cranial nerves grossly intact.  Mentation and speech appropriate for age.  PSYCH: Mentation appears normal, affect normal/bright, judgement and insight intact, normal speech and appearance well-groomed.            Video-Visit Details    Video Start Time: 9:32 AM    Type of service:  Video Visit    Video End Time:9:47 AM    Originating Location (pt. Location): Home    Distant Location (provider location):  Off-site    Platform used for Video Visit: ShopIgniter     no

## 2022-12-06 ENCOUNTER — TELEPHONE (OUTPATIENT)
Dept: FAMILY MEDICINE | Facility: CLINIC | Age: 42
End: 2022-12-06
Payer: COMMERCIAL

## 2022-12-06 NOTE — TELEPHONE ENCOUNTER
Patient Quality Outreach    Patient is due for the following:   Hypertension -  BP check    Next Steps:   Schedule a nurse only visit for Bp check    Type of outreach:    Sent [employer] message.      Questions for provider review:    None     Ezequiel Reed MA

## 2022-12-21 NOTE — PROGRESS NOTES
The provider did not complete and documentation for this visit, and has left [facility]. Therefore, the visit will not be charged.    Note was reviewed and clinical supervision provided by Nino Murray Psy.D, LP  December 21, 2022

## 2022-12-22 NOTE — PROGRESS NOTES
The provider did not complete and documentation for this visit, and has left [facility]. Therefore, the visit will not be charged.    Note was reviewed and clinical supervision provided by Nino Murray Psy.D, LP  December 21, 2022

## 2023-01-02 NOTE — TELEPHONE ENCOUNTER
Form completed and in TC bin.    Ally May M.D.   
Printed [employer] Attending Provider Statement form. Placed in Dr Boom Covington's box.  Conchita Pedraza MA  [facility]  2nd Floor  Primary Care    
Received signed form. Faxed to [employer], [phone], right fax confirmed at 5:25 pm today, 11/21/2022.  Copy to TC and abstracting.  Conchita Pedraza MA  [facility]  2nd Floor  Primary Care    
n/a  
Positive

## 2023-01-08 ENCOUNTER — HEALTH MAINTENANCE LETTER (OUTPATIENT)
Age: 43
End: 2023-01-08

## 2023-01-09 DIAGNOSIS — F32.1 CURRENT MODERATE EPISODE OF MAJOR DEPRESSIVE DISORDER WITHOUT PRIOR EPISODE (H): Chronic | ICD-10-CM

## 2023-01-10 RX ORDER — BUPROPION HYDROCHLORIDE 300 MG/1
TABLET ORAL
Qty: 30 TABLET | Refills: 1 | OUTPATIENT
Start: 2023-01-10

## 2023-02-12 DIAGNOSIS — F32.1 CURRENT MODERATE EPISODE OF MAJOR DEPRESSIVE DISORDER WITHOUT PRIOR EPISODE (H): Chronic | ICD-10-CM

## 2023-02-14 RX ORDER — BUPROPION HYDROCHLORIDE 300 MG/1
300 TABLET ORAL EVERY MORNING
Qty: 30 TABLET | Refills: 0 | Status: SHIPPED | OUTPATIENT
Start: 2023-02-14

## 2023-03-15 DIAGNOSIS — I10 UNCONTROLLED HYPERTENSION: ICD-10-CM

## 2023-03-15 RX ORDER — HYDRALAZINE HYDROCHLORIDE 25 MG/1
25 TABLET, FILM COATED ORAL 2 TIMES DAILY
Qty: 180 TABLET | Refills: 0 | Status: SHIPPED | OUTPATIENT
Start: 2023-03-15

## 2023-05-13 DIAGNOSIS — F41.9 ANXIETY: ICD-10-CM

## 2023-05-13 DIAGNOSIS — F32.1 CURRENT MODERATE EPISODE OF MAJOR DEPRESSIVE DISORDER WITHOUT PRIOR EPISODE (H): Chronic | ICD-10-CM

## 2023-05-15 RX ORDER — ESCITALOPRAM OXALATE 20 MG/1
TABLET ORAL
Qty: 90 TABLET | Refills: 0 | OUTPATIENT
Start: 2023-05-15

## 2023-05-15 RX ORDER — ESCITALOPRAM OXALATE 20 MG/1
20 TABLET ORAL DAILY
Qty: 30 TABLET | Refills: 0 | Status: SHIPPED | OUTPATIENT
Start: 2023-05-15

## 2023-05-15 NOTE — TELEPHONE ENCOUNTER
Routing refill request below to patient's PCP to review per Dr. Goyal request.         AURORA Vazquez  [facility]

## 2023-07-16 ENCOUNTER — HEALTH MAINTENANCE LETTER (OUTPATIENT)
Age: 43
End: 2023-07-16

## 2023-08-08 ENCOUNTER — TELEPHONE (OUTPATIENT)
Dept: FAMILY MEDICINE | Facility: CLINIC | Age: 43
End: 2023-08-08
Payer: COMMERCIAL

## 2023-08-08 NOTE — TELEPHONE ENCOUNTER
Patient Quality Outreach    Patient is due for the following:   Cervical Cancer Screening - PAP Needed  Depression  -  PHQ-9 needed and PHQ-A needed  Physical Preventive Adult Physical    Next Steps:   Schedule a Adult Preventative    Type of outreach:    Sent [employer] message.    Next Steps:  Reach out within 90 days via Phone.    Max number of attempts reached: No. Will try again in 90 days if patient still on fail list.    Questions for provider review:    None           Stephanie Good MA

## 2023-11-14 ENCOUNTER — TELEPHONE (OUTPATIENT)
Dept: FAMILY MEDICINE | Facility: CLINIC | Age: 43
End: 2023-11-14
Payer: COMMERCIAL

## 2023-11-14 NOTE — TELEPHONE ENCOUNTER
Patient Quality Outreach    Patient is due for the following:   Cervical Cancer Screening - PAP Needed  Depression  -  PHQ-9 needed and DAP  Physical Preventive Adult Physical      Topic Date Due    Hepatitis B Vaccine (2 of 3 - 19+ 3-dose series) 10/21/2009    Flu Vaccine (1) 09/01/2023    COVID-19 Vaccine (3 - 2023-24 season) 09/01/2023       Next Steps:   Schedule a Adult Preventative    Type of outreach:    Sent letter.    Next Steps:  Reach out within 90 days via Letter.    Max number of attempts reached: No. Will try again in 90 days if patient still on fail list.    Questions for provider review:    None           Stephanie Good MA

## 2023-11-14 NOTE — LETTER
November 14, 2023    Kandy AVERY [facility]  [address]   [place] [address]        Dear Kandy,    At [facility] we care about your health and are committed to providing quality patient care.     Here is a list of Health Maintenance topics that are due now or due soon:  Health Maintenance Due   Topic Date Due    ADVANCE CARE PLANNING  Never done    DEPRESSION ACTION PLAN  Never done    HIV SCREENING  Never done    HEPATITIS B IMMUNIZATION (2 of 3 - 19+ 3-dose series) 10/21/2009    YEARLY PREVENTIVE VISIT  05/03/2022    HPV TEST  08/29/2022    PAP  08/29/2022    PHQ-9  04/17/2023    ANNUAL REVIEW OF HM ORDERS  06/03/2023    INFLUENZA VACCINE (1) 09/01/2023    COVID-19 Vaccine (3 - 2023-24 season) 09/01/2023        We are recommending that you:  Schedule a WELLNESS (Preventative/Physical) APPOINTMENT with your primary care provider. If you go elsewhere for your wellness appointments then please disregard this reminder    ,   Schedule a PAP SMEAR EXAM which is due.  Please disregard this reminder if you have had this exam elsewhere within the last year.  It would be helpful for us to have a copy of your recent pap smear report in our file so that we can best coordinate your care.    If you are under/uninsured, we recommend you contact the [facility]. They offer pap smears at no charge or on a sliding fee charge. You can schedule with them at [phone]. Please have them send us the results.    , and   Complete the attached Questionnaire:  You are due to complete the attached PHQ questionnaire. Please complete as soon as you are able.    To schedule an appointment or discuss this further, you may contact us by phone at the [facility] at [phone] or online through the patient portal/[email or web address] @ [email or web address]    Thank you for trusting [facility] and we appreciate the opportunity to serve you.  We look forward to supporting  your healthcare needs in the future.    Your partners in health,      Quality Committee at [facility]

## 2024-02-10 ENCOUNTER — HEALTH MAINTENANCE LETTER (OUTPATIENT)
Age: 44
End: 2024-02-10

## 2024-06-10 ENCOUNTER — TELEPHONE (OUTPATIENT)
Dept: FAMILY MEDICINE | Facility: CLINIC | Age: 44
End: 2024-06-10
Payer: COMMERCIAL

## 2024-06-10 NOTE — TELEPHONE ENCOUNTER
Patient Quality Outreach    Patient is due for the following:   Hypertension -  Hypertension follow-up visit  Breast Cancer Screening - Mammogram  Cervical Cancer Screening - PAP Needed  Depression  -  PHQ-A needed  Physical Preventive Adult Physical    Next Steps:   Schedule a Adult Preventative    Type of outreach:    Sent [employer] message.    Next Steps:  Reach out within 90 days via Usabilla.    Max number of attempts reached: No. Will try again in 90 days if patient still on fail list.    Questions for provider review:    None           Sherif Paniagua MA

## 2024-09-07 ENCOUNTER — HEALTH MAINTENANCE LETTER (OUTPATIENT)
Age: 44
End: 2024-09-07

## 2025-07-03 NOTE — PROGRESS NOTES
[facility] Dermatology Note    Encounter Date: Jul 7, 2025    Dermatology Problem List:  1. Keloids  - s/p ILK 20, 1 ml 7/7/25  ____________________________________    Impression/Plan:  1. Keloids  Left areola on either side of the nipple 2/2 to previous piercing.   - S/p ILK 20, 1 ml  - Procedure note provided below   - After positioning and cleansing with isopropyl alcohol, 1.0 total mL of triamcinolone 20 mg/mL was injected into 2 lesion(s) on the left areola. The patient tolerated the procedure well and left the dermatology clinic in good condition.      Follow-up in 2-3 months.     Staff Involved:  Staff/Scribe  Scribe Disclosure:   I, Eloina Cash, am serving as a scribe to document services personally performed by Addi Ramos MD based on data collection and the provider's statements to me.     Marci Rashid MD  [facility] Dermatology PGY-3     Provider Disclosure:   The documentation recorded by the scribe accurately reflects the services I personally performed and the decisions made by me.    Staff Physician Comments:   I saw and evaluated the patient with the resident and I edited the assessment and plan as documented in the note. I was present for the entire minor procedure and examination.    Addi Ramos MD   of Dermatology  Department of Dermatology  [facility]        CC:   Chief Complaint   Patient presents with    Keloid     Patient had a possible keloid on nipple. She had a piercing on on this location and noticed pumps developing.        History of Present Illness:  Ms. Kandy Rosales is a 45 year old female who presents as a new patient. Patient is self-referred.     Today, patient reports she had a nipple piercing and subsequently turned into keloids at the area of the entry and exit point of the nipple. Multiple family members have had treatment for keloids and she has numerous of keloids from  previous trauma. She would to only treat the nipple and discuss treatment options.     Labs:  Not reviewed     Physical exam:  Vitals: There were no vitals taken for this visit.  GEN: This is a well developed, well-nourished female in no acute distress, in a pleasant mood.    SKIN: Mascorro phototype VI  - Focused exam of the bilateral breast and abdomen  - hypertrophic firm plaques on the areola on the each side of the nipple  - No other lesions of concern on areas examined.     Past Medical History:   Past Medical History:   Diagnosis Date    Anxiety     Benign essential hypertension     Factor V Leiden carrier     Major depressive disorder with single episode      Past Surgical History:   Procedure Laterality Date     SECTION      5    LAP VENTRAL HERNIA REPAIR  2018    TUBAL LIGATION  2017       Social History:   reports that she has never smoked. She has never used smokeless tobacco. She reports current alcohol use. She reports that she does not use drugs.    Family History:  Family History   Problem Relation Age of Onset    Chronic Kidney Disease Mother         ESRD    Hypertension Mother     Anxiety Disorder Mother     Depression Mother     Hypertension Father     Hypothyroidism Sister     Hypertension Sister     Anxiety Disorder Sister     Depression Sister     Diabetes Maternal Grandmother     Hypertension Maternal Grandmother     Fibroids Sister     Hypertension Sister        Medications:  Current Outpatient Medications   Medication Sig Dispense Refill    amLODIPine (NORVASC) 10 MG tablet Take 10 mg by mouth daily.      buPROPion (WELLBUTRIN XL) 300 MG 24 hr tablet Take 1 tablet (300 mg) by mouth every morning Visit due. 30 tablet 0    CHOLECALCIFEROL 25 MCG (1000 UT) tablet Take 2,000 Units by mouth daily.      escitalopram (LEXAPRO) 20 MG tablet Take 1 tablet (20 mg) by mouth daily Needs visit and/or labs, which can be in person but a virtual visit may be acceptable, for more. 30  tablet 0    hydrochlorothiazide (HYDRODIURIL) 50 MG tablet Take 0.5 tablets (25 mg) by mouth daily 90 tablet 0    losartan (COZAAR) 100 MG tablet Take 1 tablet by mouth daily.      metoprolol succinate ER (TOPROL-XL) 100 MG 24 hr tablet TAKE 1 TABLET BY MOUTH EVERY DAY 90 tablet 0    amLODIPine-valsartan (EXFORGE)  MG tablet Take 1 tablet by mouth daily 90 tablet 0    hydrALAZINE (APRESOLINE) 25 MG tablet TAKE 1 TABLET (25 MG) BY MOUTH 2 TIMES DAILY FOR BLOOD PRESSURE 180 tablet 0    hydrOXYzine (ATARAX) 25 MG tablet Take 1-4 tablets ( mg) by mouth every 8 hours as needed for anxiety or other (insomnia) 90 tablet 1    methylPREDNISolone (MEDROL DOSEPAK) 4 MG tablet therapy pack Follow package instructions 21 tablet 0     No Known Allergies

## 2025-07-07 ENCOUNTER — OFFICE VISIT (OUTPATIENT)
Dept: DERMATOLOGY | Facility: CLINIC | Age: 45
End: 2025-07-07
Payer: COMMERCIAL

## 2025-07-07 DIAGNOSIS — L91.0 KELOID: Primary | ICD-10-CM

## 2025-07-07 RX ORDER — AMLODIPINE BESYLATE 10 MG/1
10 TABLET ORAL DAILY
COMMUNITY
Start: 2025-04-28

## 2025-07-07 RX ORDER — LOSARTAN POTASSIUM 100 MG/1
1 TABLET ORAL DAILY
COMMUNITY
Start: 2025-04-30

## 2025-07-07 ASSESSMENT — PAIN SCALES - GENERAL: PAINLEVEL_OUTOF10: NO PAIN (0)

## 2025-07-07 NOTE — LETTER
7/7/2025      Kandy Rosales  [address]  [place] [address]      Dear Colleague,    Thank you for referring your patient, Kandy Rosales, to the [facility]. Please see a copy of my visit note below.    [facility] Dermatology Note    Encounter Date: Jul 7, 2025    Dermatology Problem List:  1. Keloids  - s/p ILK 20, 1 ml 7/7/25  ____________________________________    Impression/Plan:  1. Keloids  Left areola on either side of the nipple 2/2 to previous piercing.   - S/p ILK 20, 1 ml  - Procedure note provided below   - After positioning and cleansing with isopropyl alcohol, 1.0 total mL of triamcinolone 20 mg/mL was injected into 2 lesion(s) on the left areola. The patient tolerated the procedure well and left the dermatology clinic in good condition.      Follow-up in 2-3 months.     Staff Involved:  Staff/Scribe  Scribe Disclosure:   I, Eloina Cash, am serving as a scribe to document services personally performed by Addi Ramos MD based on data collection and the provider's statements to me.     Marci Rashid MD  [facility] Dermatology PGY-3     Provider Disclosure:   The documentation recorded by the scribe accurately reflects the services I personally performed and the decisions made by me.    Staff Physician Comments:   I saw and evaluated the patient with the resident and I edited the assessment and plan as documented in the note. I was present for the entire minor procedure and examination.    Addi Ramos MD   of Dermatology  Department of Dermatology  [facility]        CC:   Chief Complaint   Patient presents with     Keloid     Patient had a possible keloid on nipple. She had a piercing on on this location and noticed pumps developing.        History of Present Illness:  Ms. Kandy Rosales is a 45 year old female who presents as a new patient. Patient is  self-referred.     Today, patient reports she had a nipple piercing and subsequently turned into keloids at the area of the entry and exit point of the nipple. Multiple family members have had treatment for keloids and she has numerous of keloids from previous trauma. She would to only treat the nipple and discuss treatment options.     Labs:  Not reviewed     Physical exam:  Vitals: There were no vitals taken for this visit.  GEN: This is a well developed, well-nourished female in no acute distress, in a pleasant mood.    SKIN: Mascorro phototype VI  - Focused exam of the bilateral breast and abdomen  - hypertrophic firm plaques on the areola on the each side of the nipple  - No other lesions of concern on areas examined.     Past Medical History:   Past Medical History:   Diagnosis Date     Anxiety      Benign essential hypertension      Factor V Leiden carrier      Major depressive disorder with single episode      Past Surgical History:   Procedure Laterality Date      SECTION      5     LAP VENTRAL HERNIA REPAIR  2018     TUBAL LIGATION  2017       Social History:   reports that she has never smoked. She has never used smokeless tobacco. She reports current alcohol use. She reports that she does not use drugs.    Family History:  Family History   Problem Relation Age of Onset     Chronic Kidney Disease Mother         ESRD     Hypertension Mother      Anxiety Disorder Mother      Depression Mother      Hypertension Father      Hypothyroidism Sister      Hypertension Sister      Anxiety Disorder Sister      Depression Sister      Diabetes Maternal Grandmother      Hypertension Maternal Grandmother      Fibroids Sister      Hypertension Sister        Medications:  Current Outpatient Medications   Medication Sig Dispense Refill     amLODIPine (NORVASC) 10 MG tablet Take 10 mg by mouth daily.       buPROPion (WELLBUTRIN XL) 300 MG 24 hr tablet Take 1 tablet (300 mg) by mouth every morning  Visit due. 30 tablet 0     CHOLECALCIFEROL 25 MCG (1000 UT) tablet Take 2,000 Units by mouth daily.       escitalopram (LEXAPRO) 20 MG tablet Take 1 tablet (20 mg) by mouth daily Needs visit and/or labs, which can be in person but a virtual visit may be acceptable, for more. 30 tablet 0     hydrochlorothiazide (HYDRODIURIL) 50 MG tablet Take 0.5 tablets (25 mg) by mouth daily 90 tablet 0     losartan (COZAAR) 100 MG tablet Take 1 tablet by mouth daily.       metoprolol succinate ER (TOPROL-XL) 100 MG 24 hr tablet TAKE 1 TABLET BY MOUTH EVERY DAY 90 tablet 0     amLODIPine-valsartan (EXFORGE)  MG tablet Take 1 tablet by mouth daily 90 tablet 0     hydrALAZINE (APRESOLINE) 25 MG tablet TAKE 1 TABLET (25 MG) BY MOUTH 2 TIMES DAILY FOR BLOOD PRESSURE 180 tablet 0     hydrOXYzine (ATARAX) 25 MG tablet Take 1-4 tablets ( mg) by mouth every 8 hours as needed for anxiety or other (insomnia) 90 tablet 1     methylPREDNISolone (MEDROL DOSEPAK) 4 MG tablet therapy pack Follow package instructions 21 tablet 0     No Known Allergies                Again, thank you for allowing me to participate in the care of your patient.        Sincerely,        Addi Ramos MD    Electronically signed

## 2025-07-07 NOTE — NURSING NOTE
Drug Administration Record    Prior to injection, provider verified patient identity using patient's name and date of birth.  Due to injection administration, patient instructed to remain in clinic for 15 minutes afterwards, and to report any adverse reaction to me immediately.    Drug Name: triamcinolone acetonide (kenalog-20)  Dose: 1 mL  Route administered: Intra-lesional  NDC #: [address]-49  Amount of waste(mL): 0 mL  Reason for waste: Not applicable, Multi dose vial    LOT #: [id number]  SITE: See procedure note  : [employer]  EXPIRATION DATE: 08/31/26  Aydee Bertrand CMA

## 2025-07-07 NOTE — NURSING NOTE
Kandy Rosales's chief complaint for this visit includes:  Chief Complaint   Patient presents with    Keloid     Patient had a possible keloid on nipple. She had a piercing on on this location and noticed pumps developing.      PCP: Ally Richardson    Referring Provider:  Referred Self, MD  No address on file    There were no vitals taken for this visit.  No Pain (0)      No Known Allergies      Do you need any medication refills at today's visit?    Tita Vila on 7/7/2025 at 2:11 PM

## 2025-07-13 ENCOUNTER — HEALTH MAINTENANCE LETTER (OUTPATIENT)
Age: 45
End: 2025-07-13